# Patient Record
Sex: MALE | Race: WHITE | NOT HISPANIC OR LATINO | Employment: FULL TIME | URBAN - NONMETROPOLITAN AREA
[De-identification: names, ages, dates, MRNs, and addresses within clinical notes are randomized per-mention and may not be internally consistent; named-entity substitution may affect disease eponyms.]

---

## 2018-05-25 ENCOUNTER — OFFICE VISIT CONVERTED (OUTPATIENT)
Dept: FAMILY MEDICINE CLINIC | Age: 53
End: 2018-05-25
Attending: FAMILY MEDICINE

## 2018-06-08 ENCOUNTER — OFFICE VISIT CONVERTED (OUTPATIENT)
Dept: ORTHOPEDIC SURGERY | Facility: CLINIC | Age: 53
End: 2018-06-08
Attending: ORTHOPAEDIC SURGERY

## 2019-03-01 ENCOUNTER — OFFICE VISIT CONVERTED (OUTPATIENT)
Dept: CARDIOLOGY | Facility: CLINIC | Age: 54
End: 2019-03-01
Attending: INTERNAL MEDICINE

## 2019-03-06 ENCOUNTER — HOSPITAL ENCOUNTER (OUTPATIENT)
Dept: OTHER | Facility: HOSPITAL | Age: 54
Discharge: HOME | End: 2019-03-06
Attending: INTERNAL MEDICINE

## 2019-03-06 LAB
ALBUMIN SERPL-MCNC: 4.8 G/DL (ref 3.5–5)
ALBUMIN/GLOB SERPL: 1.8 {RATIO} (ref 1.4–2.6)
ALP SERPL-CCNC: 73 U/L (ref 56–119)
ALT SERPL-CCNC: 44 U/L (ref 10–40)
ANION GAP SERPL CALC-SCNC: 19 MMOL/L (ref 8–19)
AST SERPL-CCNC: 24 U/L (ref 15–50)
BILIRUB SERPL-MCNC: 0.52 MG/DL (ref 0.2–1.3)
BUN SERPL-MCNC: 19 MG/DL (ref 5–25)
BUN/CREAT SERPL: 16 {RATIO} (ref 6–20)
CALCIUM SERPL-MCNC: 9.7 MG/DL (ref 8.7–10.4)
CHLORIDE SERPL-SCNC: 101 MMOL/L (ref 99–111)
CHOLEST SERPL-MCNC: 136 MG/DL (ref 107–200)
CHOLEST/HDLC SERPL: 4.1 {RATIO} (ref 3–6)
CONV CO2: 25 MMOL/L (ref 22–32)
CONV TOTAL PROTEIN: 7.5 G/DL (ref 6.3–8.2)
CREAT UR-MCNC: 1.21 MG/DL (ref 0.7–1.2)
GFR SERPLBLD BASED ON 1.73 SQ M-ARVRAT: >60 ML/MIN/{1.73_M2}
GLOBULIN UR ELPH-MCNC: 2.7 G/DL (ref 2–3.5)
GLUCOSE SERPL-MCNC: 151 MG/DL (ref 70–99)
HDLC SERPL-MCNC: 33 MG/DL (ref 40–60)
LDLC SERPL CALC-MCNC: 72 MG/DL (ref 70–100)
OSMOLALITY SERPL CALC.SUM OF ELEC: 297 MOSM/KG (ref 273–304)
POTASSIUM SERPL-SCNC: 4.2 MMOL/L (ref 3.5–5.3)
SODIUM SERPL-SCNC: 141 MMOL/L (ref 135–147)
TRIGL SERPL-MCNC: 156 MG/DL (ref 40–150)
VLDLC SERPL-MCNC: 31 MG/DL (ref 5–37)

## 2019-03-07 ENCOUNTER — CONVERSION ENCOUNTER (OUTPATIENT)
Dept: CARDIOLOGY | Facility: CLINIC | Age: 54
End: 2019-03-07
Attending: INTERNAL MEDICINE

## 2019-05-06 ENCOUNTER — HOSPITAL ENCOUNTER (OUTPATIENT)
Dept: SLEEP MEDICINE | Facility: HOSPITAL | Age: 54
Discharge: HOME | End: 2019-05-06
Attending: INTERNAL MEDICINE

## 2019-05-23 ENCOUNTER — HOSPITAL ENCOUNTER (OUTPATIENT)
Dept: SLEEP MEDICINE | Facility: HOSPITAL | Age: 54
Discharge: HOME | End: 2019-05-23
Attending: INTERNAL MEDICINE

## 2019-06-04 ENCOUNTER — OFFICE VISIT CONVERTED (OUTPATIENT)
Dept: FAMILY MEDICINE CLINIC | Age: 54
End: 2019-06-04
Attending: FAMILY MEDICINE

## 2019-06-04 ENCOUNTER — HOSPITAL ENCOUNTER (OUTPATIENT)
Dept: OTHER | Facility: HOSPITAL | Age: 54
Discharge: HOME | End: 2019-06-04
Attending: FAMILY MEDICINE

## 2019-06-04 LAB
ALBUMIN SERPL-MCNC: 5.1 G/DL (ref 3.5–5)
ALBUMIN/GLOB SERPL: 1.9 {RATIO} (ref 1.4–2.6)
ALP SERPL-CCNC: 80 U/L (ref 56–119)
ALT SERPL-CCNC: 68 U/L (ref 10–40)
ANION GAP SERPL CALC-SCNC: 23 MMOL/L (ref 8–19)
AST SERPL-CCNC: 43 U/L (ref 15–50)
BILIRUB SERPL-MCNC: 0.44 MG/DL (ref 0.2–1.3)
BUN SERPL-MCNC: 16 MG/DL (ref 5–25)
BUN/CREAT SERPL: 16 {RATIO} (ref 6–20)
CALCIUM SERPL-MCNC: 9.9 MG/DL (ref 8.7–10.4)
CHLORIDE SERPL-SCNC: 95 MMOL/L (ref 99–111)
CK SERPL-CCNC: 391 U/L (ref 57–374)
CONV CO2: 24 MMOL/L (ref 22–32)
CONV TOTAL PROTEIN: 7.8 G/DL (ref 6.3–8.2)
CREAT UR-MCNC: 0.98 MG/DL (ref 0.7–1.2)
EST. AVERAGE GLUCOSE BLD GHB EST-MCNC: 166 MG/DL
GFR SERPLBLD BASED ON 1.73 SQ M-ARVRAT: >60 ML/MIN/{1.73_M2}
GLOBULIN UR ELPH-MCNC: 2.7 G/DL (ref 2–3.5)
GLUCOSE SERPL-MCNC: 139 MG/DL (ref 70–99)
HBA1C MFR BLD: 7.4 % (ref 3.5–5.7)
OSMOLALITY SERPL CALC.SUM OF ELEC: 289 MOSM/KG (ref 273–304)
POTASSIUM SERPL-SCNC: 3.9 MMOL/L (ref 3.5–5.3)
PSA SERPL-MCNC: 0.54 NG/ML (ref 0–4)
SODIUM SERPL-SCNC: 138 MMOL/L (ref 135–147)

## 2019-06-11 ENCOUNTER — OFFICE VISIT CONVERTED (OUTPATIENT)
Dept: CARDIOLOGY | Facility: CLINIC | Age: 54
End: 2019-06-11
Attending: INTERNAL MEDICINE

## 2019-08-05 ENCOUNTER — HOSPITAL ENCOUNTER (OUTPATIENT)
Dept: SLEEP MEDICINE | Facility: HOSPITAL | Age: 54
Discharge: HOME | End: 2019-08-05
Attending: INTERNAL MEDICINE

## 2019-08-05 ENCOUNTER — OUTSIDE FACILITY SERVICE (OUTPATIENT)
Dept: SLEEP MEDICINE | Facility: HOSPITAL | Age: 54
End: 2019-08-05

## 2019-08-05 PROCEDURE — 99204 OFFICE O/P NEW MOD 45 MIN: CPT | Performed by: INTERNAL MEDICINE

## 2019-09-16 ENCOUNTER — OFFICE VISIT CONVERTED (OUTPATIENT)
Dept: FAMILY MEDICINE CLINIC | Age: 54
End: 2019-09-16
Attending: FAMILY MEDICINE

## 2019-10-02 ENCOUNTER — HOSPITAL ENCOUNTER (OUTPATIENT)
Dept: LAB | Facility: HOSPITAL | Age: 54
Discharge: HOME | End: 2019-10-02
Attending: FAMILY MEDICINE

## 2019-10-02 LAB
ALBUMIN SERPL-MCNC: 5 G/DL (ref 3.5–5)
ALBUMIN/GLOB SERPL: 1.8 {RATIO} (ref 1.4–2.6)
ALP SERPL-CCNC: 77 U/L (ref 56–119)
ALT SERPL-CCNC: 85 U/L (ref 10–40)
ANION GAP SERPL CALC-SCNC: 21 MMOL/L (ref 8–19)
AST SERPL-CCNC: 63 U/L (ref 15–50)
BILIRUB SERPL-MCNC: 0.35 MG/DL (ref 0.2–1.3)
BUN SERPL-MCNC: 18 MG/DL (ref 5–25)
BUN/CREAT SERPL: 16 {RATIO} (ref 6–20)
CALCIUM SERPL-MCNC: 10.2 MG/DL (ref 8.7–10.4)
CHLORIDE SERPL-SCNC: 97 MMOL/L (ref 99–111)
CHOLEST SERPL-MCNC: 162 MG/DL (ref 107–200)
CHOLEST/HDLC SERPL: 5.8 {RATIO} (ref 3–6)
CK SERPL-CCNC: 772 U/L (ref 57–374)
CONV CO2: 24 MMOL/L (ref 22–32)
CONV TOTAL PROTEIN: 7.8 G/DL (ref 6.3–8.2)
CREAT UR-MCNC: 1.15 MG/DL (ref 0.7–1.2)
EST. AVERAGE GLUCOSE BLD GHB EST-MCNC: 157 MG/DL
GFR SERPLBLD BASED ON 1.73 SQ M-ARVRAT: >60 ML/MIN/{1.73_M2}
GLOBULIN UR ELPH-MCNC: 2.8 G/DL (ref 2–3.5)
GLUCOSE SERPL-MCNC: 113 MG/DL (ref 70–99)
HBA1C MFR BLD: 7.1 % (ref 3.5–5.7)
HDLC SERPL-MCNC: 28 MG/DL (ref 40–60)
LDLC SERPL CALC-MCNC: 54 MG/DL (ref 70–100)
OSMOLALITY SERPL CALC.SUM OF ELEC: 289 MOSM/KG (ref 273–304)
POTASSIUM SERPL-SCNC: 4 MMOL/L (ref 3.5–5.3)
SODIUM SERPL-SCNC: 138 MMOL/L (ref 135–147)
TRIGL SERPL-MCNC: 400 MG/DL (ref 40–150)
VLDLC SERPL-MCNC: 80 MG/DL (ref 5–37)

## 2019-10-17 ENCOUNTER — OFFICE VISIT CONVERTED (OUTPATIENT)
Dept: ORTHOPEDIC SURGERY | Facility: CLINIC | Age: 54
End: 2019-10-17
Attending: ORTHOPAEDIC SURGERY

## 2019-10-22 ENCOUNTER — HOSPITAL ENCOUNTER (OUTPATIENT)
Dept: OTHER | Facility: HOSPITAL | Age: 54
Discharge: HOME | End: 2019-10-22
Attending: FAMILY MEDICINE

## 2019-10-22 LAB
ALT SERPL-CCNC: 78 U/L (ref 10–40)
AST SERPL-CCNC: 54 U/L (ref 15–50)
CK SERPL-CCNC: 444 U/L (ref 57–374)

## 2019-10-23 ENCOUNTER — HOSPITAL ENCOUNTER (OUTPATIENT)
Dept: GENERAL RADIOLOGY | Facility: HOSPITAL | Age: 54
Discharge: HOME | End: 2019-10-23
Attending: ORTHOPAEDIC SURGERY

## 2019-11-21 ENCOUNTER — OFFICE VISIT CONVERTED (OUTPATIENT)
Dept: ORTHOPEDIC SURGERY | Facility: CLINIC | Age: 54
End: 2019-11-21
Attending: ORTHOPAEDIC SURGERY

## 2019-11-21 ENCOUNTER — CONVERSION ENCOUNTER (OUTPATIENT)
Dept: ORTHOPEDIC SURGERY | Facility: CLINIC | Age: 54
End: 2019-11-21

## 2019-12-20 ENCOUNTER — OFFICE VISIT CONVERTED (OUTPATIENT)
Dept: CARDIOLOGY | Facility: CLINIC | Age: 54
End: 2019-12-20
Attending: INTERNAL MEDICINE

## 2019-12-23 ENCOUNTER — HOSPITAL ENCOUNTER (OUTPATIENT)
Dept: LAB | Facility: HOSPITAL | Age: 54
Discharge: HOME | End: 2019-12-23
Attending: INTERNAL MEDICINE

## 2019-12-23 LAB
ALBUMIN SERPL-MCNC: 4.6 G/DL (ref 3.5–5)
ALBUMIN/GLOB SERPL: 1.5 {RATIO} (ref 1.4–2.6)
ALP SERPL-CCNC: 97 U/L (ref 56–119)
ALT SERPL-CCNC: 59 U/L (ref 10–40)
ANION GAP SERPL CALC-SCNC: 19 MMOL/L (ref 8–19)
AST SERPL-CCNC: 39 U/L (ref 15–50)
BILIRUB SERPL-MCNC: 0.29 MG/DL (ref 0.2–1.3)
BUN SERPL-MCNC: 20 MG/DL (ref 5–25)
BUN/CREAT SERPL: 21 {RATIO} (ref 6–20)
CALCIUM SERPL-MCNC: 9.5 MG/DL (ref 8.7–10.4)
CHLORIDE SERPL-SCNC: 98 MMOL/L (ref 99–111)
CHOLEST SERPL-MCNC: 227 MG/DL (ref 107–200)
CHOLEST/HDLC SERPL: 7.3 {RATIO} (ref 3–6)
CONV CO2: 25 MMOL/L (ref 22–32)
CONV TOTAL PROTEIN: 7.6 G/DL (ref 6.3–8.2)
CREAT UR-MCNC: 0.96 MG/DL (ref 0.7–1.2)
GFR SERPLBLD BASED ON 1.73 SQ M-ARVRAT: >60 ML/MIN/{1.73_M2}
GLOBULIN UR ELPH-MCNC: 3 G/DL (ref 2–3.5)
GLUCOSE SERPL-MCNC: 138 MG/DL (ref 70–99)
HDLC SERPL-MCNC: 31 MG/DL (ref 40–60)
LDLC SERPL CALC-MCNC: 124 MG/DL (ref 70–100)
OSMOLALITY SERPL CALC.SUM OF ELEC: 291 MOSM/KG (ref 273–304)
POTASSIUM SERPL-SCNC: 3.9 MMOL/L (ref 3.5–5.3)
SODIUM SERPL-SCNC: 138 MMOL/L (ref 135–147)
TRIGL SERPL-MCNC: 361 MG/DL (ref 40–150)
VLDLC SERPL-MCNC: 72 MG/DL (ref 5–37)

## 2020-01-02 ENCOUNTER — OFFICE VISIT CONVERTED (OUTPATIENT)
Dept: ORTHOPEDIC SURGERY | Facility: CLINIC | Age: 55
End: 2020-01-02
Attending: ORTHOPAEDIC SURGERY

## 2020-01-13 ENCOUNTER — OFFICE VISIT CONVERTED (OUTPATIENT)
Dept: FAMILY MEDICINE CLINIC | Age: 55
End: 2020-01-13
Attending: FAMILY MEDICINE

## 2020-02-13 ENCOUNTER — OFFICE VISIT CONVERTED (OUTPATIENT)
Dept: ORTHOPEDIC SURGERY | Facility: CLINIC | Age: 55
End: 2020-02-13
Attending: PHYSICIAN ASSISTANT

## 2020-02-28 ENCOUNTER — HOSPITAL ENCOUNTER (OUTPATIENT)
Dept: LAB | Facility: HOSPITAL | Age: 55
Discharge: HOME | End: 2020-02-28
Attending: FAMILY MEDICINE

## 2020-02-28 LAB
ALT SERPL-CCNC: 64 U/L (ref 10–40)
CHOLEST SERPL-MCNC: 166 MG/DL (ref 107–200)
CHOLEST/HDLC SERPL: 5 {RATIO} (ref 3–6)
HDLC SERPL-MCNC: 33 MG/DL (ref 40–60)
LDLC SERPL CALC-MCNC: 85 MG/DL (ref 70–100)
TRIGL SERPL-MCNC: 239 MG/DL (ref 40–150)
VLDLC SERPL-MCNC: 48 MG/DL (ref 5–37)

## 2020-02-29 ENCOUNTER — HOSPITAL ENCOUNTER (OUTPATIENT)
Dept: OTHER | Facility: HOSPITAL | Age: 55
Discharge: HOME | End: 2020-02-29
Attending: FAMILY MEDICINE

## 2020-02-29 LAB — CK SERPL-CCNC: 404 U/L (ref 57–374)

## 2020-04-07 ENCOUNTER — HOSPITAL ENCOUNTER (OUTPATIENT)
Dept: LAB | Facility: HOSPITAL | Age: 55
Discharge: HOME | End: 2020-04-07
Attending: INTERNAL MEDICINE

## 2020-04-07 LAB
CHOLEST SERPL-MCNC: 149 MG/DL (ref 107–200)
CHOLEST/HDLC SERPL: 4.8 {RATIO} (ref 3–6)
HDLC SERPL-MCNC: 31 MG/DL (ref 40–60)
LDLC SERPL CALC-MCNC: 85 MG/DL (ref 70–100)
TRIGL SERPL-MCNC: 165 MG/DL (ref 40–150)
VLDLC SERPL-MCNC: 33 MG/DL (ref 5–37)

## 2020-05-14 ENCOUNTER — OFFICE VISIT CONVERTED (OUTPATIENT)
Dept: ORTHOPEDIC SURGERY | Facility: CLINIC | Age: 55
End: 2020-05-14
Attending: PHYSICIAN ASSISTANT

## 2020-07-22 ENCOUNTER — OFFICE VISIT CONVERTED (OUTPATIENT)
Dept: CARDIOLOGY | Facility: CLINIC | Age: 55
End: 2020-07-22
Attending: INTERNAL MEDICINE

## 2020-10-16 ENCOUNTER — HOSPITAL ENCOUNTER (OUTPATIENT)
Dept: OTHER | Facility: HOSPITAL | Age: 55
Discharge: HOME | End: 2020-10-16
Attending: FAMILY MEDICINE

## 2020-10-16 LAB
ERYTHROCYTE [DISTWIDTH] IN BLOOD BY AUTOMATED COUNT: 11.8 % (ref 11.6–14.4)
HCT VFR BLD AUTO: 46.2 % (ref 42–52)
HGB BLD-MCNC: 15.9 G/DL (ref 14–18)
MCH RBC QN AUTO: 30.9 PG (ref 27–31)
MCHC RBC AUTO-ENTMCNC: 34.4 G/DL (ref 33–37)
MCV RBC AUTO: 89.9 FL (ref 80–96)
PLATELET # BLD AUTO: 319 10*3/UL (ref 130–400)
PMV BLD AUTO: 10 FL (ref 9.4–12.4)
PSA SERPL-MCNC: 0.42 NG/ML (ref 0–4)
RBC # BLD AUTO: 5.14 10*6/UL (ref 4.7–6.1)
TSH SERPL-ACNC: 1.16 M[IU]/L (ref 0.27–4.2)
WBC # BLD AUTO: 8.76 10*3/UL (ref 4.8–10.8)

## 2020-10-17 LAB
ALBUMIN SERPL-MCNC: 5 G/DL (ref 3.5–5)
ALBUMIN/GLOB SERPL: 1.9 {RATIO} (ref 1.4–2.6)
ALP SERPL-CCNC: 82 U/L (ref 56–119)
ALT SERPL-CCNC: 46 U/L (ref 10–40)
ANION GAP SERPL CALC-SCNC: 20 MMOL/L (ref 8–19)
AST SERPL-CCNC: 32 U/L (ref 15–50)
BILIRUB SERPL-MCNC: 0.54 MG/DL (ref 0.2–1.3)
BUN SERPL-MCNC: 12 MG/DL (ref 5–25)
BUN/CREAT SERPL: 13 {RATIO} (ref 6–20)
CALCIUM SERPL-MCNC: 9.7 MG/DL (ref 8.7–10.4)
CHLORIDE SERPL-SCNC: 102 MMOL/L (ref 99–111)
CHOLEST SERPL-MCNC: 140 MG/DL (ref 107–200)
CHOLEST/HDLC SERPL: 3.8 {RATIO} (ref 3–6)
CONV CO2: 23 MMOL/L (ref 22–32)
CONV CREATININE URINE, RANDOM: 156.9 MG/DL (ref 10–300)
CONV MICROALBUM.,U,RANDOM: 58.4 MG/L (ref 0–20)
CONV TOTAL PROTEIN: 7.6 G/DL (ref 6.3–8.2)
CREAT UR-MCNC: 0.96 MG/DL (ref 0.7–1.2)
EST. AVERAGE GLUCOSE BLD GHB EST-MCNC: 232 MG/DL
GFR SERPLBLD BASED ON 1.73 SQ M-ARVRAT: >60 ML/MIN/{1.73_M2}
GLOBULIN UR ELPH-MCNC: 2.6 G/DL (ref 2–3.5)
GLUCOSE SERPL-MCNC: 155 MG/DL (ref 70–99)
HBA1C MFR BLD: 9.7 % (ref 3.5–5.7)
HDLC SERPL-MCNC: 37 MG/DL (ref 40–60)
LDLC SERPL CALC-MCNC: 62 MG/DL (ref 70–100)
MICROALBUMIN/CREAT UR: 37.2 MG/G{CRE} (ref 0–25)
OSMOLALITY SERPL CALC.SUM OF ELEC: 295 MOSM/KG (ref 273–304)
POTASSIUM SERPL-SCNC: 3.7 MMOL/L (ref 3.5–5.3)
SODIUM SERPL-SCNC: 141 MMOL/L (ref 135–147)
TRIGL SERPL-MCNC: 204 MG/DL (ref 40–150)
VLDLC SERPL-MCNC: 41 MG/DL (ref 5–37)

## 2020-10-23 ENCOUNTER — OFFICE VISIT CONVERTED (OUTPATIENT)
Dept: FAMILY MEDICINE CLINIC | Age: 55
End: 2020-10-23
Attending: FAMILY MEDICINE

## 2020-12-29 ENCOUNTER — HOSPITAL ENCOUNTER (OUTPATIENT)
Dept: URGENT CARE | Facility: CLINIC | Age: 55
Discharge: HOME | End: 2020-12-29

## 2021-02-11 ENCOUNTER — OFFICE VISIT CONVERTED (OUTPATIENT)
Dept: CARDIOLOGY | Facility: CLINIC | Age: 56
End: 2021-02-11
Attending: INTERNAL MEDICINE

## 2021-05-07 ENCOUNTER — HOSPITAL ENCOUNTER (OUTPATIENT)
Dept: OTHER | Facility: HOSPITAL | Age: 56
Discharge: HOME | End: 2021-05-07
Attending: FAMILY MEDICINE

## 2021-05-07 LAB
ALBUMIN SERPL-MCNC: 5.2 G/DL (ref 3.5–5)
ALBUMIN/GLOB SERPL: 1.9 {RATIO} (ref 1.4–2.6)
ALP SERPL-CCNC: 66 U/L (ref 56–119)
ALT SERPL-CCNC: 48 U/L (ref 10–40)
ANION GAP SERPL CALC-SCNC: 16 MMOL/L (ref 8–19)
AST SERPL-CCNC: 38 U/L (ref 15–50)
BILIRUB SERPL-MCNC: 0.73 MG/DL (ref 0.2–1.3)
BUN SERPL-MCNC: 13 MG/DL (ref 5–25)
BUN/CREAT SERPL: 12 {RATIO} (ref 6–20)
CALCIUM SERPL-MCNC: 9.6 MG/DL (ref 8.7–10.4)
CHLORIDE SERPL-SCNC: 98 MMOL/L (ref 99–111)
CHOLEST SERPL-MCNC: 131 MG/DL (ref 107–200)
CHOLEST/HDLC SERPL: 3.6 {RATIO} (ref 3–6)
CK SERPL-CCNC: 499 U/L (ref 57–374)
CONV CO2: 28 MMOL/L (ref 22–32)
CONV CREATININE URINE, RANDOM: 95.4 MG/DL (ref 10–300)
CONV MICROALBUM.,U,RANDOM: 15 MG/L (ref 0–20)
CONV TOTAL PROTEIN: 8 G/DL (ref 6.3–8.2)
CREAT UR-MCNC: 1.06 MG/DL (ref 0.7–1.2)
ERYTHROCYTE [DISTWIDTH] IN BLOOD BY AUTOMATED COUNT: 12.5 % (ref 11.5–14.5)
EST. AVERAGE GLUCOSE BLD GHB EST-MCNC: 157 MG/DL
GFR SERPLBLD BASED ON 1.73 SQ M-ARVRAT: >60 ML/MIN/{1.73_M2}
GLOBULIN UR ELPH-MCNC: 2.8 G/DL (ref 2–3.5)
GLUCOSE SERPL-MCNC: 121 MG/DL (ref 70–99)
HBA1C MFR BLD: 16.9 G/DL (ref 14–18)
HBA1C MFR BLD: 7.1 % (ref 3.5–5.7)
HCT VFR BLD AUTO: 50.7 % (ref 42–52)
HDLC SERPL-MCNC: 36 MG/DL (ref 40–60)
LDLC SERPL CALC-MCNC: 47 MG/DL (ref 70–100)
MCH RBC QN AUTO: 30.8 PG (ref 27–31)
MCHC RBC AUTO-ENTMCNC: 33.3 G/DL (ref 33–37)
MCV RBC AUTO: 92.5 FL (ref 80–96)
MICROALBUMIN/CREAT UR: 15.7 MG/G{CRE} (ref 0–25)
OSMOLALITY SERPL CALC.SUM OF ELEC: 287 MOSM/KG (ref 273–304)
PLATELET # BLD AUTO: 331 10*3/UL (ref 130–400)
PMV BLD AUTO: 9 FL (ref 7.4–10.4)
POTASSIUM SERPL-SCNC: 4.1 MMOL/L (ref 3.5–5.3)
RBC # BLD AUTO: 5.48 10*6/UL (ref 4.7–6.1)
SODIUM SERPL-SCNC: 138 MMOL/L (ref 135–147)
TRIGL SERPL-MCNC: 238 MG/DL (ref 40–150)
VLDLC SERPL-MCNC: 48 MG/DL (ref 5–37)
WBC # BLD AUTO: 10.8 10*3/UL (ref 4.8–10.8)

## 2021-05-11 ENCOUNTER — OFFICE VISIT CONVERTED (OUTPATIENT)
Dept: FAMILY MEDICINE CLINIC | Age: 56
End: 2021-05-11
Attending: FAMILY MEDICINE

## 2021-05-13 NOTE — PROGRESS NOTES
"   Progress Note      Patient Name: Kurt Saucedo   Patient ID: [id number]   Sex: Male   YOB: 1965    Primary Care Provider: Stevan Rubio MD   Referring Provider: Stevan Rubio MD    Visit Date: July 22, 2020    Provider: Adrian Treadwell MD   Location: [facility]   Location Address: [address] [place] [address], [facility]   [place], [place]  [id number]   Location Phone: [phone]          Chief Complaint     Coronary artery disease.       History Of Present Illness  REFERRING CARE PROVIDER: Stevan Rubio MD   Kurt Saucedo is a 55 year old /White male with a known coronary artery disease with occlusion of his RCA, hypertension, and dyslipidemia. Patient has not been suffering any anginal chest discomfort.   PAST MEDICAL HISTORY: CVA; Coronary artery disease with non-ST-elevation myocardial infarction, 100% occlusion of the RCA; Diabetes mellitus; Hyperlipidemia; Hypertension.   FAMILY HISTORY: Positive for diabetes mellitus and hypertension. Negative for heart disease.   PSYCHOSOCIAL HISTORY: Walks 6 miles daily. Previously smoked, but quit. Moderate alcohol consumption. Denies mood changes or depression.   CURRENT MEDICATIONS: Aspirin 81 mg daily; metoprolol succinate 25 mg b.i.d.; lisinopril-hydrochlorothiazide 20-25 mg daily; rosuvastatin 20 mg daily; paroxetine 20 mg daily; metformin 500 mg b.i.d.       Review of Systems  · Cardiovascular  o Denies  o : palpitations (fast, fluttering, or skipping beats), swelling (feet, ankles, hands), shortness of breath while walking or lying flat, chest pain or angina pectoris   · Respiratory  o Denies  o : chronic or frequent cough, asthma or wheezing      Vitals  Date Time BP Position Site L\R Cuff Size HR RR TEMP (F) WT  HT  BMI kg/m2 BSA m2 O2 Sat HC       07/22/2020 08:14 /83 Sitting    72 - R   258lbs 0oz 6'  2\" 33.12 2.47           Physical Examination  · Constitutional  o Appearance  o : Awake, alert, in no acute " distress.   · Eyes  o Conjunctivae  o : Normal.  · Ears, Nose, Mouth and Throat  o Oral Cavity  o :   § Oral Mucosa  § : Normal.  · Neck  o Inspection/Palpation  o : No JVD. Good carotid upstroke. No thyromegaly.  · Respiratory  o Respiratory  o : Good respiratory effort. Clear to percussion and auscultation.  · Cardiovascular  o Heart  o :   § Auscultation of Heart  § : S1, S2 normal. Regular rate and rhythm without murmurs, gallops, or rubs.  o Peripheral Vascular System  o :   § Extremities  § : Good femoral and pedal pulses. No pedal edema.  · Gastrointestinal  o Abdominal Examination  o : Soft. No tenderness or masses felt. No hepatosplenomegaly. Abdominal aorta is not palpable.  · Labs  o Labs  o : Previous LDL cholesterol was 85, HDL 71.          Assessment     ASSESSMENT & PLAN:    1.  Coronary artery disease with previous CABG, no angina, on chronic aspirin 81 mg once a day.  2.  Hyperlipidemia.  LDL is mildly above goal.  Recommended the addition of Zetia 10 mg daily and repeat lipids        and LFTs on next visit.  3.  Hypertension, controlled.      Adrian Treadwell MD  JH:vm             Electronically Signed by: Maria D Bennett-, Other -Author on July 23, 2020 03:05:24 PM  Electronically Co-signed by: Adrian Treadwell MD -Reviewer on July 27, 2020 10:40:02 AM

## 2021-05-13 NOTE — PROGRESS NOTES
Progress Note      Patient Name: Kurt Saucedo   Patient ID: [id number]   Sex: Male   YOB: 1965    Primary Care Provider: Stevan Rubio MD   Referring Provider: Stevan Rubio MD    Visit Date: May 14, 2020    Provider: Catalino Espinoza PA-C   Location: [facility]   Location Address: [address] [place] [address]  [id number]   Location Phone: [phone]          Chief Complaint  · Follow up left hip replacement      History Of Present Illness  Kurt Saucedo is a 55 year old /White male who presents today to [facility]. Patient presents for follow-up evaluation of left total hip arthroplasty, 11/6/2019 this is a 6-month follow-up. Patient states he has no complaints, no pain, no difficulty with range of motion or ambulation, denies need for anti-inflammatory medicine or pain medication. Patient has been been working and states he has been working long hours at work and has no difficulty using the hip and no pain issues.       Past Medical History  Diabetes; Heart Attack; High blood pressure; High cholesterol; Hypertension; Pain of left hip joint; Post-traumatic Primary osteoarthritis of hip, left; Primary osteoarthritis of hip, Left         Past Surgical History  *Metal Implant; Colonoscopy; Joint Surgery; Surgical Clips         Medication List  atorvastatin 20 mg oral tablet; Lipitor 20 mg oral tablet; lisinopril-hydrochlorothiazide 20-25 mg oral tablet; metformin 500 mg oral tablet; paroxetine HCl 20 mg oral tablet         Allergy List  NO KNOWN DRUG ALLERGIES       Allergies Reconciled  Family Medical History  Heart Disease; Diabetes, unspecified type; Renal Calculus         Social History  Alcohol (Current some day); Alcohol Use (Current some day); Caffeine (Current - status unknown); .; lives with parents; Recreational Drug Use (Never); Second hand smoke exposure (Current some day); Tobacco (Former); Working         Review of  "Systems  · Constitutional  o Denies  o : fever, chills, weight loss  · Cardiovascular  o Denies  o : chest pain, shortness of breath  · Gastrointestinal  o Denies  o : liver disease, heartburn, nausea, blood in stools  · Genitourinary  o Denies  o : painful urination, blood in urine  · Integument  o Denies  o : rash, itching  · Neurologic  o Denies  o : headache, weakness, loss of consciousness  · Musculoskeletal  o Denies  o : painful, swollen joints  · Psychiatric  o Denies  o : drug/alcohol addiction, anxiety, depression      Vitals  Date Time BP Position Site L\R Cuff Size HR RR TEMP (F) WT  HT  BMI kg/m2 BSA m2 O2 Sat        05/14/2020 04:12 PM      73 - R   255lbs 0oz 6'  2\" 32.74 2.46 96 %          Physical Examination  · Constitutional  o Appearance  o : well developed, well-nourished, no obvious deformities present  · Head and Face  o Head  o :   § Inspection  § : normocephalic  o Face  o :   § Inspection  § : no facial lesions  · Eyes  o Conjunctivae  o : conjunctivae normal  o Sclerae  o : sclerae white  · Ears, Nose, Mouth and Throat  o Ears  o :   § External Ears  § : appearance within normal limits  § Hearing  § : intact  o Nose  o :   § External Nose  § : appearance normal  · Neck  o Inspection/Palpation  o : normal appearance  o Range of Motion  o : full range of motion  · Respiratory  o Respiratory Effort  o : breathing unlabored  o Inspection of Chest  o : normal appearance  o Auscultation of Lungs  o : no audible wheezing or rales  · Cardiovascular  o Heart  o : regular rate  · Gastrointestinal  o Abdominal Examination  o : soft and non-tender  · Skin and Subcutaneous Tissue  o General Inspection  o : intact, no rashes  · Psychiatric  o General  o : Alert and oriented x3  o Judgement and Insight  o : judgment and insight intact  o Mood and Affect  o : mood normal, affect appropriate  · Left Hip  o Inspection  o : Incision is well-healed, no redness, no swelling, no ecchymosis, no signs of " infection. Flexion 115, extension 0, abduction 40, internal rotation 35, external rotation 45, no pain with rotation. Leg lengths equal.   · In Office Procedures  o View  o : AP/LATERAL  o Site  o : left, hip   o Indication  o : Left hip pain   o Study  o : X-rays ordered, taken in the office, and reviewed today.  o Xray  o : reveals an intact appearing left hip replacement without complication, no evidence of periprosthetic fracture, subsidence or loosening           Assessment  · Aftercare following surgery of left total hip arthroplasty, 11/6/2019     V54.81  · Left Pain: Hip     719.45/M25.559      Plan  · Orders  o Hip (Left) 2 or more views (includes AP Pelvis) [facility] Preferred View (71684) - 719.45/M25.559 - 05/14/2020  · Medications  o Medications have been Reconciled  o Transition of Care or Provider Policy  · Instructions  o Reviewed the patient's Past Medical, Social, and Family history as well as the ROS at today's visit, no changes.  o Call or return if worsening symptoms.  o Follow up in 6 months.  o Reviewed x-rays with patient, discuss that he should continue activity as tolerated, follow-up in 6 months for 1 year evaluation with x-rays.            Electronically Signed by: Catalino Espinoza PA-C -Author on May 14, 2020 05:19:41 PM

## 2021-05-14 NOTE — PROGRESS NOTES
"   Progress Note      Patient Name: Kurt Saucedo   Patient ID: [id number]   Sex: Male   YOB: 1965    Primary Care Provider: Stevan Rubio MD   Referring Provider: Stevan Rubio MD    Visit Date: February 11, 2021    Provider: Adrian Treadwell MD   Location: [facility]   Location Address: [address] [place] [address], [facility]   [place], [place]  [id number]   Location Phone: [phone]          Chief Complaint     Coronary artery disease.       History Of Present Illness  Video Conferencing Visit  Kurt Saucedo is a 55 year old /White male with CAD and prior occlusion of the RCA, hypertension, and dyslipidemia who has been doing well. No complaints or problems. Denies chest pain or shortness of breath. Evaluation via video conferencing. Verbal consent obtained before beginning visit.   The following staff were present during this visit: Provider only.      PAST MEDICAL HISTORY: CVA; Coronary artery disease with non-ST-elevation myocardial infarction, 100% occlusion of the RCA; Diabetes mellitus; Hyperlipidemia; Hypertension.     CURRENT MEDICATIONS:  Aspirin 81 mg daily; ezetimibe 10 mg daily; metoprolol succinate ER 25 mg daily; lisinopril-hydrochlorothiazide 20-25 mg daily; rosuvastatin 20 mg daily; paroxetine 20 mg daily; metformin  mg b.i.d.    ALLERGIES:  No known drug allergies.       Vitals     Patient unable to obtain vitals.  Weight 244.  Height 6'2\".           Assessment     ASSESSMENT & PLAN:    1.  Coronary artery disease.  No angina.  On chronic aspirin 81 mg once a day.  2.  Hyperlipidemia.  Previously mildly above goal but added on Zetia.  Will check lipids and LFTs.  Goal LDL of        less than 70.    3.  Hypertension.  Recommended the patient get a home blood pressure monitor to keep track of his blood        pressure recordings.          Adrian Treadwell MD  JH:vm             Electronically Signed by: Maria D Bennett-, Other -Author on February 15, " 2021 07:14:59 PM  Electronically Co-signed by: Adrian Treadwell MD -Reviewer on February 16, 2021 02:02:04 PM

## 2021-05-15 VITALS — WEIGHT: 266.25 LBS | HEIGHT: 74 IN | HEART RATE: 92 BPM | OXYGEN SATURATION: 97 % | BODY MASS INDEX: 34.17 KG/M2

## 2021-05-15 VITALS — HEART RATE: 93 BPM | WEIGHT: 262 LBS | HEIGHT: 74 IN | BODY MASS INDEX: 33.62 KG/M2 | OXYGEN SATURATION: 98 %

## 2021-05-15 VITALS
SYSTOLIC BLOOD PRESSURE: 138 MMHG | BODY MASS INDEX: 32.73 KG/M2 | HEIGHT: 74 IN | DIASTOLIC BLOOD PRESSURE: 84 MMHG | WEIGHT: 255 LBS | HEART RATE: 76 BPM

## 2021-05-15 VITALS
SYSTOLIC BLOOD PRESSURE: 136 MMHG | DIASTOLIC BLOOD PRESSURE: 83 MMHG | HEART RATE: 72 BPM | WEIGHT: 258 LBS | BODY MASS INDEX: 33.11 KG/M2 | HEIGHT: 74 IN

## 2021-05-15 VITALS — BODY MASS INDEX: 32.73 KG/M2 | WEIGHT: 255 LBS | OXYGEN SATURATION: 96 % | HEART RATE: 73 BPM | HEIGHT: 74 IN

## 2021-05-15 VITALS
WEIGHT: 267 LBS | HEART RATE: 78 BPM | BODY MASS INDEX: 34.27 KG/M2 | SYSTOLIC BLOOD PRESSURE: 120 MMHG | HEIGHT: 74 IN | DIASTOLIC BLOOD PRESSURE: 80 MMHG

## 2021-05-15 VITALS — BODY MASS INDEX: 34.14 KG/M2 | WEIGHT: 266 LBS | HEIGHT: 74 IN | HEART RATE: 89 BPM | OXYGEN SATURATION: 97 %

## 2021-05-16 VITALS
HEART RATE: 86 BPM | WEIGHT: 266 LBS | HEIGHT: 74 IN | SYSTOLIC BLOOD PRESSURE: 140 MMHG | BODY MASS INDEX: 34.14 KG/M2 | DIASTOLIC BLOOD PRESSURE: 86 MMHG

## 2021-05-16 VITALS — HEIGHT: 74 IN | WEIGHT: 252.12 LBS | BODY MASS INDEX: 32.36 KG/M2 | HEART RATE: 92 BPM | OXYGEN SATURATION: 98 %

## 2021-05-16 VITALS
DIASTOLIC BLOOD PRESSURE: 77 MMHG | HEART RATE: 72 BPM | BODY MASS INDEX: 33.88 KG/M2 | HEIGHT: 74 IN | WEIGHT: 264 LBS | SYSTOLIC BLOOD PRESSURE: 128 MMHG

## 2021-05-18 NOTE — PROGRESS NOTES
Kurt Saucedo 1965     Office/Outpatient Visit    Visit Date: Mon, Sep 16, 2019 12:46 pm    Provider: Stevan Rubio MD (Assistant: Kita Mckinley RN)    Location: [facility]        Electronically signed by Stevan Rubio MD on  09/16/2019 05:11:15 PM                             SUBJECTIVE:        CC: diabetes, blood pressure, cholesterol, CAD         HPI:         Mr. Saucedo presents with type 2 diabetes.  Current meds include an oral hypoglycemic ( Glucophage XR ).  He reports home blood glucose readings have been fairly good, with average fasting glucoses running in the 120-150 mg/dL range. He checks his glucose 1 to 2 times daily.  Most recent lab results include Hemoglobin A1c:  7.4 (%) (06/04/2019), LDL:  82 (02/27/2018).          Concerning mixed hyperlipidemia, current treatment includes Lipitor.  Compliance with treatment has been good; he takes his medication as directed and follows up as directed.          With regard to the essential hypertension, his current cardiac medication regimen includes a combination medication ( Zestoretic ).  He is tolerating the medication well without side effects.  Compliance with treatment has been good; he takes his medication as directed and follows up as directed.      ROS:     CONSTITUTIONAL:  Negative for chills and fever.      CARDIOVASCULAR:  Negative for chest pain and palpitations.      RESPIRATORY:  Negative for recent cough and dyspnea.      GASTROINTESTINAL:  Negative for abdominal pain, nausea and vomiting.          PM/FM/SH:     Last Reviewed on 9/16/2019 01:16 PM by Stevan Rubio    Past Medical History:             PAST MEDICAL HISTORY         Coronary Artery Disease    Hypertension     non a non b hepatitis     Hospitalizations: hepatitis         PREVENTIVE HEALTH MAINTENANCE             COLORECTAL CANCER SCREENING: Up to date (colonoscopy q10y; sigmoidoscopy q5y; Cologuard q3y) was last done 05/2017, Results are in chart;  colonoscopy with the following abnormalities noted-- tubular adenoma         Surgical History:         Circumcision: at birth;     Fracture Repair: pelvis; 1997;       Tonsillectomy/Adenoidectomy; at age 9     Procedures: Treadmill stress test 2000         Family History:     Father: Arrhythmia ( Atrial Fibrillation ); Congestive Heart Failure;  COPD;  Type 2 Diabetes     Mother: Healthy         Social History:     Occupation:      Marital Status:          Tobacco/Alcohol/Supplements:     Last Reviewed on 9/16/2019 01:16 PM by Stevan Rubio    Tobacco: He has a past history of cigarette smoking; quit date:  02/01/17.          Alcohol: Frequency:    'once in a while';         Substance Abuse History:     Last Reviewed on 9/16/2019 01:16 PM by Stevan Rubio        Mental Health History:     Last Reviewed on 9/16/2019 01:16 PM by Stevan Rubio        Communicable Diseases (eg STDs):     Last Reviewed on 9/16/2019 01:16 PM by Stevan Rubio            Current Problems:     Last Reviewed on 9/16/2019 01:16 PM by Stevan Rubio    Coronary artery disease     Hip pain     Type 2 diabetes     Mixed hyperlipidemia     Premature ejaculation     Essential hypertension     Screening for rectal cancer     Screening for prostate cancer         Immunizations:     Havrix -adult dose (HepA) 12/5/2018     Havrix -adult dose (HepA) 6/1/2018     Fluzone (3 + years dose) 10/1/2018         Allergies:     Last Reviewed on 9/16/2019 01:16 PM by Stevan Rubio      No Known Drug Allergies.         Current Medications:     Last Reviewed on 9/16/2019 01:16 PM by Stevan Rubio    Lisinopril/Hydrochlorothiazide 20mg/25mg Tablet Take 1 tablet(s) by mouth daily     Metformin HCl 500mg Tablets, Extended Release Take 2 tablet(s) by mouth daily     Paxil 20mg Tablet Take 1 tablet(s) by mouth daily     Atorvastatin Calcium 80mg Tablet 1 tab daily     Aspirin (ASA) 81mg Tablets,  Enteric Coated 1 tab daily     Brilinta 90mg Tablet 1 TAB DAILY     Metoprolol Succinate 25mg Tablets, Extended Release 1 tablet BID         OBJECTIVE:        Vitals:         Current: 9/16/2019 12:51:05 PM    Ht:  6 ft, 2 in;  Wt: 264.6 lbs;  BMI: 34.0    T: 97.8 F (oral);  BP: 133/75 mm Hg (left arm, sitting);  P: 61 bpm (left arm (BP Cuff), sitting);  sCr: 0.98 mg/dL;  GFR: 104.52        Exams:     PHYSICAL EXAM:     GENERAL: Vitals recorded well developed,  moderately obese;     EYES: extraocular movements intact; conjunctiva and cornea are normal; PERRL;     E/N/T: EARS:  normal external auditory canals and tympanic membranes;  grossly normal hearing; OROPHARYNX:  normal mucosa, dentition, gingiva, and posterior pharynx;     NECK: range of motion is normal; thyroid is non-palpable;     RESPIRATORY: normal respiratory rate and pattern with no distress; normal breath sounds with no rales, rhonchi, wheezes or rubs;     CARDIOVASCULAR: normal rate; rhythm is regular;  no systolic murmur;     GASTROINTESTINAL: nontender; normal bowel sounds; no masses;     LYMPHATIC: no enlargement of cervical or facial nodes; no supraclavicular nodes;     SKIN:  no significant rashes or lesions; no suspicious moles;     NEUROLOGIC: mental status: alert and oriented x 3; cranial nerves II-XII grossly intact;     PSYCHIATRIC: appropriate affect and demeanor; normal psychomotor function;         ASSESSMENT           250.00   E11.9  Type 2 diabetes              DDx:     272.2   E78.2  Mixed hyperlipidemia              DDx:     401.1   I10  Essential hypertension              DDx:     414.01   I25.119  Coronary artery disease              DDx:         ORDERS:         Radiology/Test Orders:       3017F  Colorectal CA screen results documented and reviewed (PV)  (In-House)           Lab Orders:       [id number]  ([facility])  (CC DR. CARMELA ELIZABETH - CARDIOLOGY        ===============================)       [id number]  HTNLP -  [facility] CMP AND LIPID: [id number], [id number]  (Send-Out)         38133  CK - [facility]- CK total  (Send-Out)           Other Orders:         Depression screen negative  (In-House)                   PLAN:          Type 2 diabetes     Today, we have reviewed his care.  We will arrange repeat fasting labs and follow from there.  No near term change is anticipated.      LABORATORY:  Labs ordered to be performed today include HgbA1C.  [facility] PHQ-9 Depression Screening: Completed form scanned and in chart; Total Score 0; Negative Depression Screen           Orders:       [id number]  A1CEG - [facility] Hemoglobin A1C  (Send-Out)  (CC DR. CARMELA ELIZABETH - CARDIOLOGY        ===============================)         Depression screen negative  (In-House)         3017F  Colorectal CA screen results documented and reviewed (PV)  (In-House)             Patient Education Handouts:       Non-Insulin Dependent Diabetes Mellitus (NIDDM)           Mixed hyperlipidemia     LABORATORY:  Labs ordered to be performed today include CK, total and HTN/Lipid Panel: CMP, Lipid.            Orders:       [id number]  HTNLP - [facility] CMP AND LIPID: [id number], [id number]  (Send-Out)         35202  CK - [facility]- CK total  (Send-Out)            Essential hypertension As above.          Coronary artery disease As above.             CHARGE CAPTURE           **Please note: ICD descriptions below are intended for billing purposes only and may not represent clinical diagnoses**        Primary Diagnosis:         250.00 Type 2 diabetes            E11.9    Type 2 diabetes mellitus without complications              Orders:          [id number]   Office/outpatient visit; established patient, level 4  (In-House)                Depression screen negative  (In-House)             3017F   Colorectal CA screen results documented and reviewed (PV)  (In-House)           272.2 Mixed hyperlipidemia            E78.2    Mixed hyperlipidemia    401.1 Essential hypertension            I10    Essential (primary)  hypertension    414.01 Coronary artery disease            I25.119    Atherosclerotic heart disease of native coronary artery with unspecified angina pectoris

## 2021-05-18 NOTE — PROGRESS NOTES
Kurt Saucedo  1965     Office/Outpatient Visit    Visit Date: Tue, May 11, 2021 01:44 pm    Provider: Stevan Rubio MD (Assistant: Kita Mckinley RN)    Location: [facility]        Electronically signed by Stevan Rubio MD on  05/12/2021 01:30:45 PM                             Subjective:        CC: diabetes, blood pressure, cholesterol    HPI:           Patient presents with type 2 diabetes mellitus without complications.  Current meds include an oral hypoglycemic ( Glucophage XR and Jardiance ).  He reports home blood glucose readings have been fairly good, with average fasting glucoses running in the 120-150 mg/dL range.  Most recent lab results include Hemoglobin A1c:  9.7 (%) (10/16/2020),  7.1 (%) (05/07/2021), LDL:  47 (mg/dL) (05/07/2021).            Concerning essential (primary) hypertension, his current cardiac medication regimen includes a beta-blocker ( Toprol-XL ) and a combination medication ( Zestoretic ).  Review of his blood pressure log reveals systolics in the 120s.  He is tolerating the medication well without side effects.  Compliance with treatment has been good; he takes his medication as directed and follows up as directed.            In regard to the mixed hyperlipidemia, current treatment includes Crestor and Zetia and diet.  Compliance with treatment has been good; he takes his medication as directed and follows up as directed.      ROS:     CONSTITUTIONAL:  Negative for chills and fever.      CARDIOVASCULAR:  Negative for chest pain and palpitations.      RESPIRATORY:  Negative for recent cough and dyspnea.      GASTROINTESTINAL:  Negative for abdominal pain, nausea and vomiting.      INTEGUMENTARY:  Negative for atypical mole(s) and rash.          Past Medical History / Family History / Social History:         Last Reviewed on 5/11/2021 02:26 PM by Stevan Rubio    Past Medical History:             PAST MEDICAL HISTORY         Coronary  Patient is asking if he needed to be tested for COVID again. Patient is symtptom free. He was tested at the hospital on 11/13/2020. Informed him he did not need to be tested again. Patient verbalized understanding.    Artery Disease    Hyperlipidemia    Hypertension     Type 2 Diabetes     non a non b hepatitis     Hospitalizations: hepatitis         PREVENTIVE HEALTH MAINTENANCE             COLORECTAL CANCER SCREENING: Up to date (colonoscopy q10y; sigmoidoscopy q5y; Cologuard q3y) was last done 05/2017, Results are in chart; colonoscopy with the following abnormalities noted-- tubular adenoma         Surgical History:         Circumcision: at birth;     Fracture Repair: pelvis; 1997;     Joint Replacement: L Hip; 11/2019;     Tonsillectomy/Adenoidectomy; at age 9     Procedures: Treadmill stress test 2000         Family History:     Father: Arrhythmia ( Atrial Fibrillation ); Congestive Heart Failure;  COPD;  Type 2 Diabetes     Mother: Healthy         Social History:     Occupation: [employer]     Marital Status:          Tobacco/Alcohol/Supplements:     Last Reviewed on 5/11/2021 02:26 PM by Stevan Rubio    Tobacco: He has a past history of cigarette smoking; quit date:  02/01/17.  Kurt was around 20 years old when he started smoking.  He stopped smoking at around age 52.  During the 32 years that he smoked, he averaged about 1.5 packs daily.  He has over 40 pack years.         Alcohol: Frequency:    'once in a while';         Substance Abuse History:     Last Reviewed on 5/11/2021 02:26 PM by Stevan Rubio        Mental Health History:     Last Reviewed on 5/11/2021 02:26 PM by Stevan Rubio        Communicable Diseases (eg STDs):     Last Reviewed on 5/11/2021 02:26 PM by Stevan Rubio        Current Problems:     Last Reviewed on 5/11/2021 02:26 PM by Stevan Rubio    Essential (primary) hypertension    Premature ejaculation    Mixed hyperlipidemia    Encounter for screening for malignant neoplasm of prostate    Type 2 diabetes mellitus without complications    Encounter for screening for malignant neoplasm of rectum    Pain in left hip    Atherosclerotic heart disease of  native coronary artery with unspecified angina pectoris    Encounter for screening for depression    Encounter for general adult medical examination with abnormal findings        Immunizations:     influenza, injectable, quadrivalent 10/18/2019    Havrix -adult dose (HepA) 12/5/2018    Havrix -adult dose (HepA) 6/1/2018    Fluzone (3 + years dose) 10/1/2018        Allergies:     Last Reviewed on 5/11/2021 02:26 PM by Stevan Rubio    No Known Allergies.        Current Medications:     Last Reviewed on 5/11/2021 02:26 PM by Stevan Rubio    Paxil 20 mg oral tablet [TAKE ONE TABLET BY MOUTH DAILY]    lisinopril-hydrochlorothiazide 20-25 mg oral tablet [TAKE ONE TABLET BY MOUTH DAILY]    metFORMIN 500 mg oral Tablet, Extended Release 24 hr [TAKE TWO TABLETS BY MOUTH DAILY]    Metoprolol Succinate 25 mg oral Tablet, Extended Release 24 hr [1 tablet BID]    Brilinta 90 mg oral tablet [1 TAB DAILY]    aspirin 81 mg oral tablet, delayed release (enteric coated) [1 tab daily]    rosuvastatin 20 mg oral tablet [TAKE ONE TABLET BY MOUTH DAILY]    ezetimibe 10 mg oral tablet [once daily]    Jardiance 25 mg oral tablet [take 1 tablet (25 mg) by oral route once daily in the morning]    Accu-Chek Farida Plus test strips  [check blood sugar once daily  DX E11.9]        Objective:        Vitals:         Current: 5/11/2021 1:49:07 PM    Ht:  6 ft, 2 in;  Wt: 255 lbs;  BMI: 32.7T: 96.3 F (temporal);  BP: 116/63 mm Hg (right arm, sitting);  P: 68 bpm (right arm (BP Cuff), sitting);  sCr: 1.06 mg/dL;  GFR: 93.03        Exams:     PHYSICAL EXAM:     GENERAL: Vitals recorded well developed, well nourished;     NECK: range of motion is normal; thyroid is non-palpable;     RESPIRATORY: normal respiratory rate and pattern with no distress; normal breath sounds with no rales, rhonchi, wheezes or rubs;     CARDIOVASCULAR: normal rate; rhythm is regular;  no systolic murmur;     GASTROINTESTINAL: nontender; normal bowel  sounds; no masses;     LYMPHATIC: no enlargement of cervical or facial nodes; no supraclavicular nodes;     SKIN:  no significant rashes or lesions; no suspicious moles;     NEUROLOGIC: mental status: alert and oriented x 3; cranial nerves II-XII grossly intact;     PSYCHIATRIC: appropriate affect and demeanor; normal psychomotor function;         Assessment:         E11.9   Type 2 diabetes mellitus without complications       I10   Essential (primary) hypertension       E78.2   Mixed hyperlipidemia       F17.211   Nicotine dependence, cigarettes, in remission           ORDERS:         Meds Prescribed:       [Refilled] lisinopril-hydrochlorothiazide 20-25 mg oral tablet [TAKE ONE TABLET BY MOUTH DAILY], #90 (ninety) tablets, Refills: 1 (one)       [Refilled] Paxil 20 mg oral tablet [TAKE ONE TABLET BY MOUTH DAILY], #90 (ninety) tablets, Refills: 1 (one)       [Refilled] Jardiance 25 mg oral tablet [take 1 tablet (25 mg) by oral route once daily in the morning], #90 (ninety) tablets, Refills: 1 (one)       [Refilled] metFORMIN 500 mg oral Tablet, Extended Release 24 hr [TAKE TWO TABLETS BY MOUTH DAILY], #180 (one hundred and eighty) tablets, Refills: 1 (one)       [Refilled] rosuvastatin 20 mg oral tablet [TAKE ONE TABLET BY MOUTH DAILY], #90 (ninety) tablets, Refills: 1 (one)         Procedures Ordered:         Low Dose CT scan (LDCT) for lung cancer screening  (Send-Out)              Other Orders:         Counseling visit to discuss lung cancer screening using low dose CT scan  (In-House)                      Plan:         Type 2 diabetes mellitus without complications        RECOMMENDATIONS given include: Today, we have reviewed Kurt's care.  He has cut his sugar dramatically.  We will continue current medications and contact him again in 90 days for A1C.  I have encouraged him to keep the good habits that have this under better control.  No other changes.  LDCT to be arranged in for him..             Prescriptions:       [Refilled] lisinopril-hydrochlorothiazide 20-25 mg oral tablet [TAKE ONE TABLET BY MOUTH DAILY], #90 (ninety) tablets, Refills: 1 (one)       [Refilled] Paxil 20 mg oral tablet [TAKE ONE TABLET BY MOUTH DAILY], #90 (ninety) tablets, Refills: 1 (one)       [Refilled] Jardiance 25 mg oral tablet [take 1 tablet (25 mg) by oral route once daily in the morning], #90 (ninety) tablets, Refills: 1 (one)       [Refilled] metFORMIN 500 mg oral Tablet, Extended Release 24 hr [TAKE TWO TABLETS BY MOUTH DAILY], #180 (one hundred and eighty) tablets, Refills: 1 (one)       [Refilled] rosuvastatin 20 mg oral tablet [TAKE ONE TABLET BY MOUTH DAILY], #90 (ninety) tablets, Refills: 1 (one)         Essential (primary) hypertensionAs above.        Mixed hyperlipidemiaAs above.        Nicotine dependence, cigarettes, in remissionAs above.    LDCT Counseling: Discussed benefits/harms of screening, follow-up diagnostic testing, over-diagnosis, false positive rate, and total radiation exposure. Counseled on importance of adherence to annual LDCT screenings, impact of co-morbidities, and ability/willingness to undergo diagnosis and treatment. Counseled on the importance of maintaining cigarette smoking abstinence. I will order the Low Dose CT today.            Orders:         Counseling visit to discuss lung cancer screening using low dose CT scan  (In-House)              Low Dose CT scan (LDCT) for lung cancer screening  (Send-Out)                  Charge Capture:         Primary Diagnosis:     E11.9  Type 2 diabetes mellitus without complications           Orders:      [id number]  Office/outpatient visit; established patient, level 4  (In-House)              I10  Essential (primary) hypertension     E78.2  Mixed hyperlipidemia     F17.211  Nicotine dependence, cigarettes, in remission           Orders:        Counseling visit to discuss lung cancer screening using low dose CT scan  (In-House)

## 2021-05-18 NOTE — PROGRESS NOTES
Kurt Saucedo LAWR[place] 1965     Office/Outpatient Visit    Visit Date: Fri, May 25, 2018 02:27 pm    Provider: Stevan Rubio MD (Assistant: Renata Maldonado MA)    Location: [facility]        Electronically signed by Stevan Rubio MD on  05/25/2018 05:20:01 PM                             SUBJECTIVE:        CC: diabetes, cholesterol, blood pressure         HPI:         Patient presents with type 2 diabetes.  Current meds include an oral hypoglycemic ( Glucophage XR ).  He reports home blood glucose readings have been fairly good, with average fasting glucoses running in the 120-150 mg/dL range. He checks his glucose once daily at most.  Most recent lab results include Hemoglobin A1c:  6.4 (02/27/2018), LDL:  82 (02/27/2018).          Additionally, he presents with history of mixed hyperlipidemia.  current treatment includes Lipitor.  Compliance with treatment has been good; he takes his medication as directed and follows up as directed.          Essential hypertension details; his current cardiac medication regimen includes a combination medication ( Zestoretic ).  He is tolerating the medication well without side effects.  Compliance with treatment has been good; he takes his medication as directed and follows up as directed.      ROS:     CONSTITUTIONAL:  Negative for chills and fever.      CARDIOVASCULAR:  Negative for chest pain and palpitations.      RESPIRATORY:  Negative for recent cough and dyspnea.      GASTROINTESTINAL:  Negative for abdominal pain, nausea and vomiting.          PMH/FMH/SH:     Last Reviewed on 5/25/2018 02:55 PM by Stevan Rubio    Past Medical History:             PAST MEDICAL HISTORY         Hypertension     non a non b hepatitis     Hospitalizations: hepatitis         PREVENTIVE HEALTH MAINTENANCE             COLORECTAL CANCER SCREENING: Up to date (colonoscopy q10y; sigmoidoscopy q5y; Cologuard q3y) was last done 05/2017, Results are in chart; colonoscopy with  the following abnormalities noted-- tubular adenoma         Surgical History:         Circumcision: at birth;     Fracture Repair: pelvis; 1997;       Tonsillectomy/Adenoidectomy; at age 9 Procedures: colonoscopy 1989, for abd pain Treadmill stress test 2000         Social History:     Occupation:      Marital Status:          Tobacco/Alcohol/Supplements:     Last Reviewed on 5/25/2018 02:55 PM by Stevan Rubio    Tobacco: He has a past history of cigarette smoking; quit date:  02/01/17.          Alcohol: Frequency:    'once in a while';         Substance Abuse History:     Last Reviewed on 5/25/2018 02:55 PM by Stevan Rubio        Mental Health History:     Last Reviewed on 5/25/2018 02:55 PM by Stevan Rubio        Communicable Diseases (eg STDs):     Last Reviewed on 5/25/2018 02:55 PM by Stevan Rubio            Current Problems:     Last Reviewed on 5/25/2018 02:55 PM by Stevan Rubio    Type 2 diabetes     Mixed hyperlipidemia     Premature ejaculation     Essential hypertension         Immunizations:     None        Allergies:     Last Reviewed on 5/25/2018 02:55 PM by Stevan Rubio      No Known Drug Allergies.         Current Medications:     Last Reviewed on 5/25/2018 02:55 PM by Stevan Rubio    Lisinopril/Hydrochlorothiazide 20mg/25mg Tablet Take 1 tablet(s) by mouth daily     Paxil 20mg Tablet Take 1 tablet(s) by mouth daily     Atorvastatin Calcium 20mg Tablet Take 1 tablet(s) by mouth daily     Metformin HCl 500mg Tablets, Extended Release Take 2 tablet(s) by mouth daily         OBJECTIVE:        Vitals:         Current: 5/25/2018 2:29:18 PM    Ht:  6 ft, 2 in;  Wt: 250.3 lbs;  BMI: 32.1    T: 97.2 F (oral);  BP: 144/85 mm Hg (left arm, sitting);  P: 84 bpm (left arm (BP Cuff), sitting);  sCr: 1.03 mg/dL;  GFR: 98.20        Exams:     PHYSICAL EXAM:     GENERAL: Vitals recorded well developed, well nourished;     EYES:  extraocular movements intact; conjunctiva and cornea are normal; PERRL;     E/N/T: EARS:  normal external auditory canals and tympanic membranes;  grossly normal hearing; OROPHARYNX:  normal mucosa, dentition, gingiva, and posterior pharynx;     NECK: range of motion is normal; thyroid is non-palpable;     RESPIRATORY: normal respiratory rate and pattern with no distress; normal breath sounds with no rales, rhonchi, wheezes or rubs;     CARDIOVASCULAR: normal rate; rhythm is regular;  no systolic murmur;     GASTROINTESTINAL: nontender; normal bowel sounds; no masses; rectal exam: normal tone; nontender, guaiac negative stool;     GENITOURINARY: prostate:  no nodules, tenderness, or enlargement;     SKIN:  no significant rashes or lesions; no suspicious moles;     Foot exam performed.      Left foot exam    Protective sensation using Monofilament test: NORMAL sensation. Patient detects .07 grams of force which is considered normal.    Vascular status: normal peripheral vascular exam with palpable dorsal pedal and posterior tibal pulses and brisk digital capillary refill    Skin is intact without sores or ulcers    Right foot exam    Protective sensation using Monofilament test: NORMAL sensation. Patient detects .07 grams of force which is considered normal.    Vascular status: normal peripheral vascular exam with palpable dorsal pedal and posterior tibal pulses and brisk digital capillary refill    Skin is intact without sores or ulcers         ASSESSMENT           250.00   E11.9  Type 2 diabetes              DDx:     272.2   E78.2  Mixed hyperlipidemia              DDx:     401.1   I10  Essential hypertension              DDx:     V76.44   Z12.5  Screening for prostate cancer              DDx:     V76.41   Z12.12  Screening for rectal cancer              DDx:     719.45   M25.552  Hip pain              DDx:         ORDERS:         Meds Prescribed:       Refill of: Lisinopril/Hydrochlorothiazide 20mg/25mg Tablet  Take 1 tablet(s) by mouth daily  #90 (Ninety) tablet(s) Refills: 1       Refill of: Paxil (Paroxetine HCl) 20mg Tablet Take 1 tablet(s) by mouth daily  #90 (Ninety) tablet(s) Refills: 1       Refill of: Atorvastatin Calcium 20mg Tablet Take 1 tablet(s) by mouth daily  #90 (Ninety) tablet(s) Refills: 1       Refill of: Metformin HCl 500mg Tablets, Extended Release Take 2 tablet(s) by mouth daily  #180 (One [place] [address]) tablet(s) Refills: 1         Radiology/Test Orders:       [id number]  Left radiologic exam, hip, unilateral; complete, minimum of two views  (Send-Out)           Lab Orders:       [id number]  COMP [facility] Comp. Metabolic Panel  (Send-Out)         03981  A1CEG [facility] Hemoglobin A1C  (Send-Out)         [id number]  [facility] PSA Screen or Medicare screening order:   (Send-Out)         [id number]  Occult blood, fecal  (In-House)           Other Orders:       [id number]  Foot examination performed (includes examination through visual inspection, sensory exam with monofi  (In-House)                   PLAN:          Type 2 diabetes     LABORATORY:  Labs ordered to be performed today include Comprehensive metabolic panel and HgbA1C.      RECOMMENDATIONS given include: Kurt is doing fairly well at this time.  We have again reviewed his care and will check labs and go from there.  No other changes are anticipated..            Prescriptions:       Refill of: Metformin HCl 500mg Tablets, Extended Release Take 2 tablet(s) by mouth daily  #180 (One [place] [address]) tablet(s) Refills: 1           Orders:       2028F  Foot examination performed (includes examination through visual inspection, sensory exam with monofi  (In-House)         [id number]  [facility] Comp. Metabolic Panel  (Send-Out)         19498  A1CEG [facility] Hemoglobin A1C  (Send-Out)             Patient Education Handouts:       [facility] Medication Compliance           Mixed hyperlipidemia As above.           Prescriptions:       Refill of: Atorvastatin Calcium 20mg  Tablet Take 1 tablet(s) by mouth daily  #90 (Ninety) tablet(s) Refills: 1          Essential hypertension As above.           Prescriptions:       Refill of: Lisinopril/Hydrochlorothiazide 20mg/25mg Tablet Take 1 tablet(s) by mouth daily  #90 (Ninety) tablet(s) Refills: 1          Screening for prostate cancer           Orders:       [id number]  [facility] PSA Screen or Medicare screening order:   (Send-Out)            Screening for rectal cancer           Orders:       [id number]  Occult blood, fecal  (In-House)            Hip pain         RADIOLOGY:  I have ordered a left hip x-ray to be done today.            Orders:       [id number]  Left radiologic exam, hip, unilateral; complete, minimum of two views  (Send-Out)               Other Prescriptions:       Refill of: Paxil (Paroxetine HCl) 20mg Tablet Take 1 tablet(s) by mouth daily  #90 (Ninety) tablet(s) Refills: 1         CHARGE CAPTURE           **Please note: ICD descriptions below are intended for billing purposes only and may not represent clinical diagnoses**        Primary Diagnosis:         250.00 Type 2 diabetes            E11.9    Type 2 diabetes mellitus without complications              Orders:          [id number]   Office/outpatient visit; established patient, level 4  (In-House)             2028F   Foot examination performed (includes examination through visual inspection, sensory exam with monofi  (In-House)           272.2 Mixed hyperlipidemia            E78.2    Mixed hyperlipidemia    401.1 Essential hypertension            I10    Essential (primary) hypertension    V76.44 Screening for prostate cancer            Z12.5    Encounter for screening for malignant neoplasm of prostate    V76.41 Screening for rectal cancer            Z12.12    Encounter for screening for malignant neoplasm of rectum              Orders:          [id number]   Occult blood, fecal  (In-House)           719.45 Hip pain            M25.552    Pain in left hip

## 2021-05-18 NOTE — PROGRESS NOTES
Kurt Saucedo 1965     Office/Outpatient Visit    Visit Date: Tue, Jun 4, 2019 04:11 pm    Provider: Stevan Rubio MD (Assistant: Kita Mckinley RN)    Location: [facility]        Electronically signed by Stevan Rubio MD on  06/05/2019 10:34:33 AM                             SUBJECTIVE:        CC: diabetes, blood pressure, cholesterol, CAD         HPI:         Patient presents with type 2 diabetes.  Current meds include an oral hypoglycemic ( Glucophage XR ).  He reports home blood glucose readings have been a bit high, with average fasting readings in the 150-180 mg/dL range.  Most recent lab results include Hemoglobin A1c:  6.0 (%) (05/25/2018).          Dx with mixed hyperlipidemia; current treatment includes Lipitor.  Compliance with treatment has been good; he takes his medication as directed and follows up as directed.          Concerning essential hypertension, his current cardiac medication regimen includes a combination medication ( Zestoretic ).  He is tolerating the medication well without side effects.  Compliance with treatment has been good; he takes his medication as directed and follows up as directed.          Concerning coronary artery disease, Mr. Saucedo has a prior history of a myocardial infarction and is currently on a beta blocker.  His heart disease was first diagnosed 4 months ago.  The course of the disease has been stable.  Currently, his treatment regimen consists of daily 81 mg aspirin, an ACEI, a beta blocker, and Lipitor.      ROS:     CONSTITUTIONAL:  Negative for chills and fever.      CARDIOVASCULAR:  Negative for chest pain and palpitations.      RESPIRATORY:  Positive for dyspnea ( with moderate exertion ).   Negative for recent cough.      GASTROINTESTINAL:  Negative for abdominal pain, nausea and vomiting.      INTEGUMENTARY:  Negative for atypical mole(s) and rash.          PMH/FMH/SH:     Last Reviewed on 6/04/2019 04:23 PM by Stevan Rubio     Past Medical History:             PAST MEDICAL HISTORY         Coronary Artery Disease    Hypertension     non a non b hepatitis     Hospitalizations: hepatitis         PREVENTIVE HEALTH MAINTENANCE             COLORECTAL CANCER SCREENING: Up to date (colonoscopy q10y; sigmoidoscopy q5y; Cologuard q3y) was last done 05/2017, Results are in chart; colonoscopy with the following abnormalities noted-- tubular adenoma         Surgical History:         Circumcision: at birth;     Fracture Repair: pelvis; 1997;       Tonsillectomy/Adenoidectomy; at age 9 Procedures: colonoscopy 1989, for abd pain Treadmill stress test 2000         Social History:     Occupation:      Marital Status:          Tobacco/Alcohol/Supplements:     Last Reviewed on 6/04/2019 04:23 PM by Stevan Rubio    Tobacco: He has a past history of cigarette smoking; quit date:  02/01/17.          Alcohol: Frequency:    'once in a while';         Substance Abuse History:     Last Reviewed on 6/04/2019 04:23 PM by Stevan Rubio        Mental Health History:     Last Reviewed on 6/04/2019 04:23 PM by Stevan Rubio        Communicable Diseases (eg STDs):     Last Reviewed on 6/04/2019 04:23 PM by Stevan Rubio            Current Problems:     Last Reviewed on 6/04/2019 04:23 PM by Stevan Rubio    Hip pain     Type 2 diabetes     Mixed hyperlipidemia     Premature ejaculation     Essential hypertension     Screening for prostate cancer         Immunizations:     Havrix -adult dose (HepA) 12/5/2018     Havrix -adult dose (HepA) 6/1/2018     Fluzone (3 + years dose) 10/1/2018         Allergies:     Last Reviewed on 6/04/2019 04:23 PM by Stevan Rubio      No Known Drug Allergies.         Current Medications:     Last Reviewed on 6/04/2019 04:23 PM by Stevan Rubio    Lisinopril/Hydrochlorothiazide 20mg/25mg Tablet Take 1 tablet(s) by mouth daily     Paxil 20mg Tablet Take 1 tablet(s) by  mouth daily     Metformin HCl 500mg Tablets, Extended Release Take 2 tablet(s) by mouth daily     Atorvastatin Calcium 80mg Tablet 1 tab daily     Aspirin (ASA) 81mg Tablets, Enteric Coated 1 tab daily     Brilinta 90mg Tablet 1 TAB DAILY     Metoprolol Succinate 25mg Tablets, Extended Release 1 tablet BID         OBJECTIVE:        Vitals:         Current: 6/4/2019 4:18:41 PM    Ht:  6 ft, 2 in;  Wt: 268 lbs;  BMI: 34.4    T: 98.3 F (oral);  BP: 128/77 mm Hg (left arm, sitting);  P: 85 bpm (left arm (BP Cuff), sitting);  sCr: 0.89 mg/dL;  GFR: 115.72        Exams:     PHYSICAL EXAM:     GENERAL: Vitals recorded well developed, well nourished;     EYES: extraocular movements intact; conjunctiva and cornea are normal; PERRL;     E/N/T: EARS:  normal external auditory canals and tympanic membranes;  grossly normal hearing; OROPHARYNX:  normal mucosa, dentition, gingiva, and posterior pharynx;     NECK: range of motion is normal; thyroid is non-palpable;     RESPIRATORY: normal respiratory rate and pattern with no distress; normal breath sounds with no rales, rhonchi, wheezes or rubs;     CARDIOVASCULAR: normal rate; rhythm is regular;  no systolic murmur;     GASTROINTESTINAL: nontender; normal bowel sounds; no masses; rectal exam: normal tone; nontender, guaiac negative stool;     GENITOURINARY: prostate:  no nodules, tenderness, or enlargement;     SKIN:  no significant rashes or lesions; no suspicious moles;         ASSESSMENT           250.00   E11.9  Type 2 diabetes              DDx:     272.2   E78.2  Mixed hyperlipidemia              DDx:     401.1   I10  Essential hypertension              DDx:     414.01   I25.119  Coronary artery disease              DDx:     V76.44   Z12.5  Screening for prostate cancer              DDx:     V76.41   Z12.12  Screening for rectal cancer              DDx:         ORDERS:         Meds Prescribed:       Refill of: Lisinopril/Hydrochlorothiazide 20mg/25mg Tablet Take 1 tablet(s)  by mouth daily  #90 (Ninety) tablet(s) Refills: 1       Refill of: Paxil (Paroxetine HCl) 20mg Tablet Take 1 tablet(s) by mouth daily  #90 (Ninety) tablet(s) Refills: 1       Refill of: Metformin HCl 500mg Tablets, Extended Release Take 2 tablet(s) by mouth daily  #180 ([address] [place] [address]) tablet(s) Refills: 1         Radiology/Test Orders:       3017F  Colorectal CA screen results documented and reviewed (PV)  (In-House)           Lab Orders:       22211  CK - [facility]- CK total  (Send-Out)         78003  COMP - [facility] Comp. Metabolic Panel  (Send-Out)         40878  A1CEG - [facility] Hemoglobin A1C  (Send-Out)         94781  [facility] PSA Screen or Medicare screening order:   (Send-Out)         84332  Occult blood, fecal  (In-House)                   PLAN:          Type 2 diabetes     Kurt seems well today.  We will refill needed medications and arrange follow up blood work.  I suspect we may have more work to do with his diabetes.     LABORATORY:  Labs ordered to be performed today include CK, total, Comprehensive metabolic panel, and HgbA1C.  MIPS     COLORECTAL CANCER SCREENING: Results are in chart           Prescriptions:       Refill of: Metformin HCl 500mg Tablets, Extended Release Take 2 tablet(s) by mouth daily  #180 ([address] [place] [address]) tablet(s) Refills: 1           Orders:       02431  CK - [facility]- CK total  (Send-Out)         41528  COMP - [facility] Comp. Metabolic Panel  (Send-Out)         16009  A1CEG - H Hemoglobin A1C  (Send-Out)         3017F  Colorectal CA screen results documented and reviewed (PV)  (In-House)             Patient Education Handouts:       Non-Insulin Dependent Diabetes Mellitus (NIDDM)           Mixed hyperlipidemia As above.          Essential hypertension As above.           Prescriptions:       Refill of: Lisinopril/Hydrochlorothiazide 20mg/25mg Tablet Take 1 tablet(s) by mouth daily  #90 (Ninety) tablet(s) Refills: 1          Coronary artery disease As above.           Screening for prostate cancer           Orders:       [id number]  [facility] PSA Screen or Medicare screening order:   (Send-Out)            Screening for rectal cancer           Orders:       82880  Occult blood, fecal  (In-House)               Other Prescriptions:       Refill of: Paxil (Paroxetine HCl) 20mg Tablet Take 1 tablet(s) by mouth daily  #90 (Ninety) tablet(s) Refills: 1         CHARGE CAPTURE           **Please note: ICD descriptions below are intended for billing purposes only and may not represent clinical diagnoses**        Primary Diagnosis:         250.00 Type 2 diabetes            E11.9    Type 2 diabetes mellitus without complications              Orders:          77299   Office/outpatient visit; established patient, level 4  (In-House)             3017F   Colorectal CA screen results documented and reviewed (PV)  (In-House)           272.2 Mixed hyperlipidemia            E78.2    Mixed hyperlipidemia    401.1 Essential hypertension            I10    Essential (primary) hypertension    414.01 Coronary artery disease            I25.119    Atherosclerotic heart disease of native coronary artery with unspecified angina pectoris    V76.44 Screening for prostate cancer            Z12.5    Encounter for screening for malignant neoplasm of prostate    V76.41 Screening for rectal cancer            Z12.12    Encounter for screening for malignant neoplasm of rectum              Orders:          [id number]   Occult blood, fecal  (In-House)

## 2021-05-18 NOTE — PROGRESS NOTES
Kurt Saucedo TIP  1965     Office/Outpatient Visit    Visit Date: Mon, Jan 13, 2020 09:08 am    Provider: Stevan Rubio MD (Assistant: Ne Gambino LPN)    Location: [facility]        Electronically signed by Stevan Rubio MD on  01/13/2020 11:29:16 PM                             Subjective:        CC: diabetes, blood pressure, cholesterol    HPI:           Patient presents with type 2 diabetes mellitus without complications.  Current meds include an oral hypoglycemic ( Glucophage XR ).  He reports home blood glucose readings have been fairly good, with average fasting glucoses running in the 120-150 mg/dL range. He checks his glucose 1 to 2 times daily.  Most recent lab results include Hemoglobin A1c:  7.1 (%) (10/02/2019), LDL:  54 (mg/dL) (10/02/2019).            Dx with essential (primary) hypertension; his current cardiac medication regimen includes a combination medication ( Zestoretic ).  He is tolerating the medication well without side effects.  Compliance with treatment has been good; he takes his medication as directed and follows up as directed.            Mixed hyperlipidemia details; current treatment includes diet.  Compliance with treatment has been good; he takes his medication as directed and follows up as directed.  Kurt was taking a high dose of Lipitor, but that was stopped due to some elevation of his liver enzymes.          PHQ-9 Depression Screening: Completed form scanned and in chart; Total Score 0     ROS:     CONSTITUTIONAL:  Negative for chills and fever.      CARDIOVASCULAR:  Negative for chest pain and palpitations.      RESPIRATORY:  Negative for recent cough and dyspnea.      GASTROINTESTINAL:  Negative for abdominal pain, nausea and vomiting.      INTEGUMENTARY:  Negative for atypical mole(s) and rash.          Past Medical History / Family History / Social History:         Last Reviewed on 1/13/2020 09:29 AM by Stevan Rubio    Past  Medical History:             PAST MEDICAL HISTORY         Coronary Artery Disease    Hypertension     non a non b hepatitis     Hospitalizations: hepatitis         PREVENTIVE HEALTH MAINTENANCE             COLORECTAL CANCER SCREENING: Up to date (colonoscopy q10y; sigmoidoscopy q5y; Cologuard q3y) was last done 05/2017, Results are in chart; colonoscopy with the following abnormalities noted-- tubular adenoma         Surgical History:         Circumcision: at birth;     Fracture Repair: pelvis; 1997;     Joint Replacement: L Hip; 11/2019;     Tonsillectomy/Adenoidectomy; at age 9     Procedures: Treadmill stress test 2000         Family History:     Father: Arrhythmia ( Atrial Fibrillation ); Congestive Heart Failure;  COPD;  Type 2 Diabetes     Mother: Healthy         Social History:     Occupation:      Marital Status:          Tobacco/Alcohol/Supplements:     Last Reviewed on 1/13/2020 09:29 AM by Stevan Rubio    Tobacco: He has a past history of cigarette smoking; quit date:  02/01/17.          Alcohol: Frequency:    'once in a while';         Substance Abuse History:     Last Reviewed on 1/13/2020 09:29 AM by Stevan Rubio        Mental Health History:     Last Reviewed on 1/13/2020 09:29 AM by Stevan Rubio        Communicable Diseases (eg STDs):     Last Reviewed on 1/13/2020 09:29 AM by Stevan Rubio        Current Problems:     Last Reviewed on 1/13/2020 09:29 AM by Stevan Rubio    Essential (primary) hypertension    Premature ejaculation    Mixed hyperlipidemia    Encounter for screening for malignant neoplasm of prostate    Type 2 diabetes mellitus without complications    Pain in left hip    Encounter for screening for malignant neoplasm of rectum    Atherosclerotic heart disease of native coronary artery with unspecified angina pectoris        Immunizations:     Havrix -adult dose (HepA) 12/5/2018    Havrix -adult dose (HepA) 6/1/2018    Fluzone  (3 + years dose) 10/1/2018    influenza, injectable, quadrivalent 10/18/2019        Allergies:     Last Reviewed on 1/13/2020 09:29 AM by Stevan Rubio    No Known Allergies.        Current Medications:     Last Reviewed on 1/13/2020 09:29 AM by Stevan Rubio    Paxil 20 mg oral tablet [TAKE ONE TABLET BY MOUTH DAILY]    lisinopril-hydrochlorothiazide 20-25 mg oral tablet [TAKE ONE TABLET BY MOUTH DAILY]    metFORMIN 500 mg oral Tablet, Extended Release 24 hr [TAKE TWO TABLETS BY MOUTH DAILY]    Metoprolol Succinate 25 mg oral Tablet, Extended Release 24 hr [1 tablet BID]    Brilinta 90 mg oral tablet [1 TAB DAILY]    aspirin 81 mg oral tablet, delayed release (enteric coated) [1 tab daily]        Objective:        Vitals:         Current: 1/13/2020 9:13:52 AM    Ht:  6 ft, 2 in;  Wt: 260 lbs;  BMI: 33.4T: 97.9 F (oral);  BP: 140/70 mm Hg (left arm, sitting);  P: 71 bpm (left arm (BP Cuff), sitting);  sCr: 1.15 mg/dL;  GFR: 88.41        Exams:     PHYSICAL EXAM:     GENERAL: Vitals recorded well developed, obese;     NECK: range of motion is normal; thyroid is non-palpable;     RESPIRATORY: normal respiratory rate and pattern with no distress; normal breath sounds with no rales, rhonchi, wheezes or rubs;     CARDIOVASCULAR: normal rate; rhythm is regular;  no systolic murmur;     GASTROINTESTINAL: nontender; normal bowel sounds; no masses;     LYMPHATIC: no enlargement of cervical or facial nodes; no supraclavicular nodes;     SKIN:  no significant rashes or lesions; no suspicious moles;     NEUROLOGIC: mental status: alert and oriented x 3; cranial nerves II-XII grossly intact;     PSYCHIATRIC: appropriate affect and demeanor; normal psychomotor function;     Foot exam performed.      Left foot exam    Protective sensation using Monofilament test: NORMAL sensation. Patient detects .07 grams of force which is considered normal.    Vascular status: normal peripheral vascular exam with palpable  dorsal pedal and posterior tibal pulses and brisk digital capillary refill    Skin is intact without sores or ulcers    Right foot exam    Protective sensation using Monofilament test: NORMAL sensation. Patient detects .07 grams of force which is considered normal.    Vascular status: normal peripheral vascular exam with palpable dorsal pedal and posterior tibal pulses and brisk digital capillary refill    Skin is intact without sores or ulcers         Lab/Test Results:         Hemoglobin A1c: 6.3 (01/13/2020),     Performed by:: timoteo (01/13/2020),             Assessment:         E11.9   Type 2 diabetes mellitus without complications       I10   Essential (primary) hypertension       E78.2   Mixed hyperlipidemia       Z13.31   Encounter for screening for depression           ORDERS:         Meds Prescribed:       [Refilled] lisinopril-hydrochlorothiazide 20-25 mg oral tablet [TAKE ONE TABLET BY MOUTH DAILY], #90 (ninety) tablets, Refills: 1 (one)       [Refilled] Paxil 20 mg oral tablet [TAKE ONE TABLET BY MOUTH DAILY], #90 (ninety) tablets, Refills: 1 (one)       [Refilled] metFORMIN 500 mg oral Tablet, Extended Release 24 hr [TAKE TWO TABLETS BY MOUTH DAILY], #180 (one hundred and eighty) tablets, Refills: 1 (one)       [New Rx] rosuvastatin 20 mg oral tablet [take 1 tablet (20 mg) by oral route once daily], #90 (ninety) tablets, Refills: 1 (one)         Radiology/Test Orders:       3017F  Colorectal CA screen results documented and reviewed (PV)  (In-House)              Lab Orders:       [id number]*  Hgb A1c fast lab  (In-House)              Procedures Ordered:       16017  Collection of capillary blood specimen (eg, finger, heel, ear stick)  (In-House)              Other Orders:       2028F  Foot examination performed (includes examination through visual inspection, sensory exam with monofilament, and pulse exam - report when any of the three components are completed) (DM)4  (In-House)              Depression  screen negative  (In-House)                      Plan:         Type 2 diabetes mellitus without complications    LABORATORY:  Labs ordered to be performed today include Hgb A1c inhouse fast lab.      RECOMMENDATIONS given include: Kurt seems well today.  We will update an A1C as he leaves and otherwise refill his medications.  We will resume statin therapy given his CAD.  I want to be careful to not hurt the liver, but his heart disease is an ongoing risk..  MIPS PHQ-9 Depression Screening: Completed form scanned and in chart; Total Score 0; Negative Depression Screen           Prescriptions:       [Refilled] metFORMIN 500 mg oral Tablet, Extended Release 24 hr [TAKE TWO TABLETS BY MOUTH DAILY], #180 (one hundred and eighty) tablets, Refills: 1 (one)           Orders:       2028F  Foot examination performed (includes examination through visual inspection, sensory exam with monofilament, and pulse exam - report when any of the three components are completed) (DM)4  (In-House)            79392*  Hgb A1c fast lab  (In-House)            3017F  Colorectal CA screen results documented and reviewed (PV)  (In-House)            24131  Collection of capillary blood specimen (eg, finger, heel, ear stick)  (In-House)              Depression screen negative  (In-House)                Patient Education Handouts:       Non-Insulin Dependent Diabetes Mellitus (NIDDM)          Essential (primary) hypertension          Prescriptions:       [Refilled] lisinopril-hydrochlorothiazide 20-25 mg oral tablet [TAKE ONE TABLET BY MOUTH DAILY], #90 (ninety) tablets, Refills: 1 (one)         Mixed hyperlipidemia          Prescriptions:       [New Rx] rosuvastatin 20 mg oral tablet [take 1 tablet (20 mg) by oral route once daily], #90 (ninety) tablets, Refills: 1 (one)             Other Prescriptions:       [Refilled] Paxil 20 mg oral tablet [TAKE ONE TABLET BY MOUTH DAILY], #90 (ninety) tablets, Refills: 1 (one)         Charge Capture:          Primary Diagnosis:     E11.9  Type 2 diabetes mellitus without complications           Orders:      60275  Office/outpatient visit; established patient, level 4  (In-House)            2028F  Foot examination performed (includes examination through visual inspection, sensory exam with monofilament, and pulse exam - report when any of the three components are completed) (DM)4  (In-House)            25414*  Hgb A1c fast lab  (In-House)            3017F  Colorectal CA screen results documented and reviewed (PV)  (In-House)            78513  Collection of capillary blood specimen (eg, finger, heel, ear stick)  (In-House)              Depression screen negative  (In-House)              I10  Essential (primary) hypertension     E78.2  Mixed hyperlipidemia     Z13.31  Encounter for screening for depression

## 2021-05-18 NOTE — PROGRESS NOTES
Kurt Saucedo TIP  1965     Office/Outpatient Visit    Visit Date: Fri, Oct 23, 2020 10:12 am    Provider: Stevan Rubio MD (Assistant: Kita Mckinley RN)    Location: [facility]        Electronically signed by Stevan Rubio MD on  10/24/2020 07:39:03 AM                             Subjective:        CC: physical exam, diabetes, blood pressure, cholesterol, anxiety        TELEMEDICINE VISIT:    - Kurt consented to this telemedicine visit.    - Persons present during the telemedicine consultation include:  Kurt - patient, Dr. Rubio    - This visit is being conducted over FaceTime with audio and video.    HPI:       Kurt is being seen for wellness exam.  He is 55 years old and works for [employer] where he has been for a little over 6 months.  He does not smoke now.  He drinks occasionally.  He is up to date on colonoscopy.  He did have recent PSA that was normal.          Dx with essential (primary) hypertension; his current cardiac medication regimen includes a combination medication ( Zestoretic ).  Review of his blood pressure log reveals systolics in the 120s.  He is tolerating the medication well without side effects.  Compliance with treatment has been good; he takes his medication as directed and follows up as directed.            In regard to the mixed hyperlipidemia, current treatment includes Crestor and Zetia and diet.  Compliance with treatment has been good; he takes his medication as directed and follows up as directed.      ROS:     CONSTITUTIONAL:  Negative for chills and fever.      CARDIOVASCULAR:  Negative for chest pain and palpitations.      RESPIRATORY:  Negative for recent cough and dyspnea.      GASTROINTESTINAL:  Negative for abdominal pain, nausea and vomiting.      INTEGUMENTARY:  Negative for atypical mole(s) and rash.          Past Medical History / Family History / Social History:         Last Reviewed on 10/24/2020 07:20 AM by Stevan Rubio  Todd    Past Medical History:             PAST MEDICAL HISTORY         Coronary Artery Disease    Hyperlipidemia    Hypertension     Type 2 Diabetes     non a non b hepatitis     Hospitalizations: hepatitis         PREVENTIVE HEALTH MAINTENANCE             COLORECTAL CANCER SCREENING: Up to date (colonoscopy q10y; sigmoidoscopy q5y; Cologuard q3y) was last done 05/2017, Results are in chart; colonoscopy with the following abnormalities noted-- tubular adenoma         Surgical History:         Circumcision: at birth;     Fracture Repair: pelvis; 1997;     Joint Replacement: L Hip; 11/2019;     Tonsillectomy/Adenoidectomy; at age 9     Procedures: Treadmill stress test 2000         Family History:     Father: Arrhythmia ( Atrial Fibrillation ); Congestive Heart Failure;  COPD;  Type 2 Diabetes     Mother: Healthy         Social History:     Occupation:      Marital Status:          Tobacco/Alcohol/Supplements:     Last Reviewed on 10/24/2020 07:20 AM by Stevan Rubio    Tobacco: He has a past history of cigarette smoking; quit date:  02/01/17.          Alcohol: Frequency:    'once in a while';         Substance Abuse History:     Last Reviewed on 10/24/2020 07:20 AM by Stevan Rubio        Mental Health History:     Last Reviewed on 10/24/2020 07:20 AM by Stevan Rubio        Communicable Diseases (eg STDs):     Last Reviewed on 10/24/2020 07:20 AM by Stevan Rubio        Current Problems:     Last Reviewed on 10/24/2020 07:20 AM by Stevan Rubio    Essential (primary) hypertension    Premature ejaculation    Mixed hyperlipidemia    Encounter for screening for malignant neoplasm of prostate    Type 2 diabetes mellitus without complications    Pain in left hip    Atherosclerotic heart disease of native coronary artery with unspecified angina pectoris    Encounter for screening for malignant neoplasm of rectum    Encounter for screening for depression     Encounter for general adult medical examination with abnormal findings        Immunizations:     influenza, injectable, quadrivalent 10/18/2019    Havrix -adult dose (HepA) 12/5/2018    Havrix -adult dose (HepA) 6/1/2018    Fluzone (3 + years dose) 10/1/2018        Allergies:     Last Reviewed on 10/24/2020 07:20 AM by Stevan Rubio    No Known Allergies.        Current Medications:     Last Reviewed on 10/24/2020 07:20 AM by Stevan Rbuio    Metoprolol Succinate 25 mg oral Tablet, Extended Release 24 hr [1 tablet BID]    Brilinta 90 mg oral tablet [1 TAB DAILY]    aspirin 81 mg oral tablet, delayed release (enteric coated) [1 tab daily]    ezetimibe 10 mg oral tablet [once daily]    Paxil 20 mg oral tablet [TAKE ONE TABLET BY MOUTH DAILY]    lisinopril-hydrochlorothiazide 20-25 mg oral tablet [TAKE ONE TABLET BY MOUTH DAILY]    metFORMIN 500 mg oral Tablet, Extended Release 24 hr [TAKE TWO TABLETS BY MOUTH DAILY]    rosuvastatin 20 mg oral tablet [TAKE ONE TABLET BY MOUTH DAILY]    Jardiance 10 mg oral tablet [take 1 tablet (10 mg) by oral route once daily in the morning]    Jardiance 25 mg oral tablet [take 1 tablet (25 mg) by oral route once daily in the morning]        Objective:        Exams:     PHYSICAL EXAM:     GENERAL: well developed, obese;     EYES: extraocular movements intact; conjunctiva and cornea are normal; PERRL;     NECK: range of motion is normal; thyroid is non-palpable; no visible finding;     RESPIRATORY: normal respiratory rate and pattern with no distress;     LYMPHATIC: no enlargement of cervical or facial nodes; no supraclavicular nodes;     SKIN:  no significant rashes or lesions; no suspicious moles;     NEUROLOGIC: mental status: alert and oriented x 3; cranial nerves II-XII grossly intact;     PSYCHIATRIC: appropriate affect and demeanor; normal psychomotor function;         Assessment:         Z00.01   Encounter for general adult medical examination with abnormal  findings       E11.9   Type 2 diabetes mellitus without complications       I10   Essential (primary) hypertension       E78.2   Mixed hyperlipidemia       I25.119   Atherosclerotic heart disease of native coronary artery with unspecified angina pectoris           ORDERS:         Radiology/Test Orders:       3017F  Colorectal CA screen results documented and reviewed (PV)  (In-House)                      Plan:         Encounter for general adult medical examination with abnormal findings        RECOMMENDATIONS given include: Today, we have reviewed Kurt's care in detail.  Exam is limited due to telehealth format.  Overall, I think he is doing okay.  However, his sugar has been running much too high.  Risks related to this are plainly discussed including recurrent heart disease.  Given known CAD, I do think adding Jardiance is reasonable.  We will start that as per the medication list and contact him in about 3 weeks to see how his sugars are doing.  Risks of Jardiance are reviewed including potential for urinary tract infection.  His other cardiac risk factors are fairly well controlled including the cholesterol.  We will refill his usual medications as well as noted.  He is up to date on other health related screenings otherwise..  [facility] Colorectal Cancer Screening is up to date and the results are in the chart           Orders:       3017F  Colorectal CA screen results documented and reviewed (PV)  (In-House)              Type 2 diabetes mellitus without complicationsAs above.        Essential (primary) hypertensionAs above.        Mixed hyperlipidemiaAs above.        Atherosclerotic heart disease of native coronary artery with unspecified angina pectorisAs above.            Charge Capture:         Primary Diagnosis:     Z00.01  Encounter for general adult medical examination with abnormal findings           Orders:      79211  Preventive medicine, established patient, age 40-64 years  (In-House)             3017F  Colorectal CA screen results documented and reviewed (PV)  (In-House)              E11.9  Type 2 diabetes mellitus without complications     I10  Essential (primary) hypertension     E78.2  Mixed hyperlipidemia     I25.119  Atherosclerotic heart disease of native coronary artery with unspecified angina pectoris

## 2021-07-01 VITALS
DIASTOLIC BLOOD PRESSURE: 75 MMHG | HEIGHT: 74 IN | BODY MASS INDEX: 33.96 KG/M2 | SYSTOLIC BLOOD PRESSURE: 133 MMHG | TEMPERATURE: 97.8 F | HEART RATE: 61 BPM | WEIGHT: 264.6 LBS

## 2021-07-01 VITALS
SYSTOLIC BLOOD PRESSURE: 144 MMHG | BODY MASS INDEX: 32.12 KG/M2 | HEIGHT: 74 IN | WEIGHT: 250.3 LBS | TEMPERATURE: 97.2 F | DIASTOLIC BLOOD PRESSURE: 85 MMHG | HEART RATE: 84 BPM

## 2021-07-01 VITALS
HEIGHT: 74 IN | SYSTOLIC BLOOD PRESSURE: 128 MMHG | BODY MASS INDEX: 34.39 KG/M2 | WEIGHT: 268 LBS | DIASTOLIC BLOOD PRESSURE: 77 MMHG | TEMPERATURE: 98.3 F | HEART RATE: 85 BPM

## 2021-07-02 VITALS
TEMPERATURE: 96.3 F | HEIGHT: 74 IN | WEIGHT: 255 LBS | SYSTOLIC BLOOD PRESSURE: 116 MMHG | HEART RATE: 68 BPM | BODY MASS INDEX: 32.73 KG/M2 | DIASTOLIC BLOOD PRESSURE: 63 MMHG

## 2021-07-02 VITALS
HEART RATE: 71 BPM | BODY MASS INDEX: 33.37 KG/M2 | SYSTOLIC BLOOD PRESSURE: 140 MMHG | TEMPERATURE: 97.9 F | DIASTOLIC BLOOD PRESSURE: 70 MMHG | WEIGHT: 260 LBS | HEIGHT: 74 IN

## 2021-08-27 RX ORDER — EZETIMIBE 10 MG/1
TABLET ORAL
Qty: 90 TABLET | Refills: 1 | Status: SHIPPED | OUTPATIENT
Start: 2021-08-27 | End: 2022-03-08 | Stop reason: SDUPTHER

## 2021-09-16 ENCOUNTER — TELEPHONE (OUTPATIENT)
Dept: FAMILY MEDICINE CLINIC | Age: 56
End: 2021-09-16

## 2021-09-17 ENCOUNTER — CLINICAL SUPPORT (OUTPATIENT)
Dept: FAMILY MEDICINE CLINIC | Age: 56
End: 2021-09-17

## 2021-09-17 VITALS — DIASTOLIC BLOOD PRESSURE: 71 MMHG | SYSTOLIC BLOOD PRESSURE: 126 MMHG | HEART RATE: 77 BPM

## 2021-09-17 DIAGNOSIS — E11.9 TYPE 2 DIABETES MELLITUS WITHOUT COMPLICATION, UNSPECIFIED WHETHER LONG TERM INSULIN USE (HCC): Primary | ICD-10-CM

## 2021-09-17 LAB — HBA1C MFR BLD: 6.9 %

## 2021-10-04 PROBLEM — E78.5 HYPERLIPIDEMIA LDL GOAL <70: Status: ACTIVE | Noted: 2021-10-04

## 2021-10-04 PROBLEM — I10 ESSENTIAL HYPERTENSION: Status: ACTIVE | Noted: 2021-10-04

## 2021-10-04 PROBLEM — I25.10 CORONARY ARTERY DISEASE INVOLVING NATIVE CORONARY ARTERY OF NATIVE HEART WITHOUT ANGINA PECTORIS: Status: ACTIVE | Noted: 2021-10-04

## 2021-10-04 NOTE — PATIENT INSTRUCTIONS
"Low-Sodium Eating Plan  Sodium, which is an element that makes up salt, helps you maintain a healthy balance of fluids in your body. Too much sodium can increase your blood pressure and cause fluid and waste to be held in your body.  Your health care provider or dietitian may recommend following this plan if you have high blood pressure (hypertension), kidney disease, liver disease, or heart failure. Eating less sodium can help lower your blood pressure, reduce swelling, and protect your heart, liver, and kidneys.  What are tips for following this plan?  Reading food labels  · The Nutrition Facts label lists the amount of sodium in one serving of the food. If you eat more than one serving, you must multiply the listed amount of sodium by the number of servings.  · Choose foods with less than 140 mg of sodium per serving.  · Avoid foods with 300 mg of sodium or more per serving.  Shopping    · Look for lower-sodium products, often labeled as \"low-sodium\" or \"no salt added.\"  · Always check the sodium content, even if foods are labeled as \"unsalted\" or \"no salt added.\"  · Buy fresh foods.  ? Avoid canned foods and pre-made or frozen meals.  ? Avoid canned, cured, or processed meats.  · Buy breads that have less than 80 mg of sodium per slice.    Cooking    · Eat more home-cooked food and less restaurant, buffet, and fast food.  · Avoid adding salt when cooking. Use salt-free seasonings or herbs instead of table salt or sea salt. Check with your health care provider or pharmacist before using salt substitutes.  · Cook with plant-based oils, such as canola, sunflower, or olive oil.    Meal planning  · When eating at a restaurant, ask that your food be prepared with less salt or no salt, if possible. Avoid dishes labeled as brined, pickled, cured, smoked, or made with soy sauce, miso, or teriyaki sauce.  · Avoid foods that contain MSG (monosodium glutamate). MSG is sometimes added to Chinese food, bouillon, and some " "canned foods.  · Make meals that can be grilled, baked, poached, roasted, or steamed. These are generally made with less sodium.  General information  Most people on this plan should limit their sodium intake to 1,500-2,000 mg (milligrams) of sodium each day.  What foods should I eat?  Fruits  Fresh, frozen, or canned fruit. Fruit juice.  Vegetables  Fresh or frozen vegetables. \"No salt added\" canned vegetables. \"No salt added\" tomato sauce and paste. Low-sodium or reduced-sodium tomato and vegetable juice.  Grains  Low-sodium cereals, including oats, puffed wheat and rice, and shredded wheat. Low-sodium crackers. Unsalted rice. Unsalted pasta. Low-sodium bread. Whole-grain breads and whole-grain pasta.  Meats and other proteins  Fresh or frozen (no salt added) meat, poultry, seafood, and fish. Low-sodium canned tuna and salmon. Unsalted nuts. Dried peas, beans, and lentils without added salt. Unsalted canned beans. Eggs. Unsalted nut butters.  Dairy  Milk. Soy milk. Cheese that is naturally low in sodium, such as ricotta cheese, fresh mozzarella, or Swiss cheese. Low-sodium or reduced-sodium cheese. Cream cheese. Yogurt.  Seasonings and condiments  Fresh and dried herbs and spices. Salt-free seasonings. Low-sodium mustard and ketchup. Sodium-free salad dressing. Sodium-free light mayonnaise. Fresh or refrigerated horseradish. Lemon juice. Vinegar.  Other foods  Homemade, reduced-sodium, or low-sodium soups. Unsalted popcorn and pretzels. Low-salt or salt-free chips.  The items listed above may not be a complete list of foods and beverages you can eat. Contact a dietitian for more information.  What foods should I avoid?  Vegetables  Sauerkraut, pickled vegetables, and relishes. Olives. French fries. Onion rings. Regular canned vegetables (not low-sodium or reduced-sodium). Regular canned tomato sauce and paste (not low-sodium or reduced-sodium). Regular tomato and vegetable juice (not low-sodium or reduced-sodium). " Frozen vegetables in sauces.  Grains  Instant hot cereals. Bread stuffing, pancake, and biscuit mixes. Croutons. Seasoned rice or pasta mixes. Noodle soup cups. Boxed or frozen macaroni and cheese. Regular salted crackers. Self-rising flour.  Meats and other proteins  Meat or fish that is salted, canned, smoked, spiced, or pickled. Precooked or cured meat, such as sausages or meat loaves. Mendez. Ham. Pepperoni. Hot dogs. Corned beef. Chipped beef. Salt pork. Jerky. Pickled herring. Anchovies and sardines. Regular canned tuna. Salted nuts.  Dairy  Processed cheese and cheese spreads. Hard cheeses. Cheese curds. Blue cheese. Feta cheese. String cheese. Regular cottage cheese. Buttermilk. Canned milk.  Fats and oils  Salted butter. Regular margarine. Ghee. Mendez fat.  Seasonings and condiments  Onion salt, garlic salt, seasoned salt, table salt, and sea salt. Canned and packaged gravies. Worcestershire sauce. Tartar sauce. Barbecue sauce. Teriyaki sauce. Soy sauce, including reduced-sodium. Steak sauce. Fish sauce. Oyster sauce. Cocktail sauce. Horseradish that you find on the shelf. Regular ketchup and mustard. Meat flavorings and tenderizers. Bouillon cubes. Hot sauce. Pre-made or packaged marinades. Pre-made or packaged taco seasonings. Relishes. Regular salad dressings. Salsa.  Other foods  Salted popcorn and pretzels. Corn chips and puffs. Potato and tortilla chips. Canned or dried soups. Pizza. Frozen entrees and pot pies.  The items listed above may not be a complete list of foods and beverages you should avoid. Contact a dietitian for more information.  Summary  · Eating less sodium can help lower your blood pressure, reduce swelling, and protect your heart, liver, and kidneys.  · Most people on this plan should limit their sodium intake to 1,500-2,000 mg (milligrams) of sodium each day.  · Canned, boxed, and frozen foods are high in sodium. Restaurant foods, fast foods, and pizza are also very high in sodium.  You also get sodium by adding salt to food.  · Try to cook at home, eat more fresh fruits and vegetables, and eat less fast food and canned, processed, or prepared foods.  This information is not intended to replace advice given to you by your health care provider. Make sure you discuss any questions you have with your health care provider.  Document Revised: 01/22/2021 Document Reviewed: 11/18/2020  [employer] Patient Education © 2021 [employer]      Cholesterol Content in Foods  Cholesterol is a waxy, fat-like substance that helps to carry fat in the blood. The body needs cholesterol in small amounts, but too much cholesterol can cause damage to the arteries and heart.  Most people should eat less than 200 milligrams (mg) of cholesterol a day.  Foods with cholesterol    Cholesterol is found in animal-based foods, such as meat, seafood, and dairy. Generally, low-fat dairy and lean meats have less cholesterol than full-fat dairy and fatty meats. The milligrams of cholesterol per serving (mg per serving) of common cholesterol-containing foods are listed below.  Meat and other proteins  · Egg -- one large whole egg has 186 mg.  · Veal shank -- 4 oz has 141 mg.  · Lean ground turkey (93% lean) -- 4 oz has 118 mg.  · Fat-trimmed lamb loin -- 4 oz has 106 mg.  · Lean ground beef (90% lean) -- 4 oz has 100 mg.  · Lobster -- 3.5 oz has 90 mg.  · Pork loin chops -- 4 oz has 86 mg.  · Canned salmon -- 3.5 oz has 83 mg.  · Fat-trimmed beef top loin -- 4 oz has 78 mg.  · Frankfurter -- 1 suzanne (3.5 oz) has 77 mg.  · Crab -- 3.5 oz has 71 mg.  · Roasted chicken without skin, white meat -- 4 oz has 66 mg.  · Light bologna -- 2 oz has 45 mg.  · Deli-cut turkey -- 2 oz has 31 mg.  · Canned tuna -- 3.5 oz has 31 mg.  · Mendez -- 1 oz has 29 mg.  · Oysters and mussels (raw) -- 3.5 oz has 25 mg.  · Mackerel -- 1 oz has 22 mg.  · Trout -- 1 oz has 20 mg.  · Pork sausage -- 1 link (1 oz) has 17 mg.  · Spring -- 1 oz has 16  mg.  · Tilapia -- 1 oz has 14 mg.  Dairy  · Soft-serve ice cream -- ½ cup (4 oz) has 103 mg.  · Whole-milk yogurt -- 1 cup (8 oz) has 29 mg.  · Cheddar cheese -- 1 oz has 28 mg.  · American cheese -- 1 oz has 28 mg.  · Whole milk -- 1 cup (8 oz) has 23 mg.  · 2% milk -- 1 cup (8 oz) has 18 mg.  · Cream cheese -- 1 tablespoon (Tbsp) has 15 mg.  · Cottage cheese -- ½ cup (4 oz) has 14 mg.  · Low-fat (1%) milk -- 1 cup (8 oz) has 10 mg.  · Sour cream -- 1 Tbsp has 8.5 mg.  · Low-fat yogurt -- 1 cup (8 oz) has 8 mg.  · Nonfat Greek yogurt -- 1 cup (8 oz) has 7 mg.  · Half-and-half cream -- 1 Tbsp has 5 mg.  Fats and oils  · Cod liver oil -- 1 tablespoon (Tbsp) has 82 mg.  · Butter -- 1 Tbsp has 15 mg.  · Lard -- 1 Tbsp has 14 mg.  · Mendez grease -- 1 Tbsp has 14 mg.  · Mayonnaise -- 1 Tbsp has 5-10 mg.  · Margarine -- 1 Tbsp has 3-10 mg.  Exact amounts of cholesterol in these foods may vary depending on specific ingredients and brands.  Foods without cholesterol  Most plant-based foods do not have cholesterol unless you combine them with a food that has cholesterol. Foods without cholesterol include:  · Grains and cereals.  · Vegetables.  · Fruits.  · Vegetable oils, such as olive, canola, and sunflower oil.  · Legumes, such as peas, beans, and lentils.  · Nuts and seeds.  · Egg whites.  Summary  · The body needs cholesterol in small amounts, but too much cholesterol can cause damage to the arteries and heart.  · Most people should eat less than 200 milligrams (mg) of cholesterol a day.  This information is not intended to replace advice given to you by your health care provider. Make sure you discuss any questions you have with your health care provider.  Document Revised: 05/10/2021 Document Reviewed: 05/10/2021  Elsevier Patient Education © 2021 [employer]

## 2021-10-14 ENCOUNTER — OFFICE VISIT (OUTPATIENT)
Dept: CARDIOLOGY | Facility: CLINIC | Age: 56
End: 2021-10-14

## 2021-10-14 VITALS
WEIGHT: 253 LBS | SYSTOLIC BLOOD PRESSURE: 135 MMHG | BODY MASS INDEX: 32.47 KG/M2 | HEIGHT: 74 IN | DIASTOLIC BLOOD PRESSURE: 71 MMHG | HEART RATE: 80 BPM

## 2021-10-14 DIAGNOSIS — I25.10 CORONARY ARTERY DISEASE INVOLVING NATIVE CORONARY ARTERY OF NATIVE HEART WITHOUT ANGINA PECTORIS: Primary | ICD-10-CM

## 2021-10-14 DIAGNOSIS — I10 ESSENTIAL HYPERTENSION: ICD-10-CM

## 2021-10-14 DIAGNOSIS — E78.5 HYPERLIPIDEMIA LDL GOAL <70: ICD-10-CM

## 2021-10-14 PROCEDURE — 99214 OFFICE O/P EST MOD 30 MIN: CPT | Performed by: INTERNAL MEDICINE

## 2021-10-14 RX ORDER — ROSUVASTATIN CALCIUM 20 MG/1
20 TABLET, COATED ORAL DAILY
COMMUNITY
Start: 2021-08-11 | End: 2021-12-09 | Stop reason: SDUPTHER

## 2021-10-14 RX ORDER — ASPIRIN 81 MG/1
81 TABLET ORAL DAILY
COMMUNITY

## 2021-10-14 RX ORDER — METFORMIN HYDROCHLORIDE 500 MG/1
TABLET, EXTENDED RELEASE ORAL 2 TIMES DAILY
COMMUNITY
Start: 2021-09-08 | End: 2021-11-16

## 2021-10-14 RX ORDER — LISINOPRIL AND HYDROCHLOROTHIAZIDE 25; 20 MG/1; MG/1
TABLET ORAL
COMMUNITY
Start: 2021-08-23 | End: 2021-11-16

## 2021-10-14 RX ORDER — PAROXETINE HYDROCHLORIDE 20 MG/1
TABLET, FILM COATED ORAL
COMMUNITY
Start: 2021-09-01 | End: 2021-11-16

## 2021-10-14 RX ORDER — EMPAGLIFLOZIN 25 MG/1
TABLET, FILM COATED ORAL
COMMUNITY
Start: 2021-09-14 | End: 2021-11-16

## 2021-10-14 RX ORDER — METOPROLOL SUCCINATE 25 MG/1
TABLET, EXTENDED RELEASE ORAL 2 TIMES DAILY
COMMUNITY
Start: 2021-09-01 | End: 2022-04-28 | Stop reason: SDUPTHER

## 2021-10-14 NOTE — ASSESSMENT & PLAN NOTE
Blood pressure well controlled continue with lisinopril HCTZ 20/25 and Toprol 25 mg once a day.  Counseled patient on  • low-sodium diet of less than 2 g  • Aerobic activity 30 minutes a day 5 times a week  • Weight loss

## 2021-10-14 NOTE — PROGRESS NOTES
"Chief Complaint  Coronary Artery Disease    Subjective    Patient is doing well denies any chest pain shortness of breath or myalgias symptoms    Past Medical History:   Diagnosis Date   • CAD 10/4/2021    non-ST-elevation myocardial infarction, 100% occlusion of the RCA   • Essential hypertension 10/4/2021   • Hyperlipidemia LDL goal <70 10/4/2021         Current Outpatient Medications:   •  aspirin 81 MG EC tablet, Take 81 mg by mouth Daily., Disp: , Rfl:   •  ezetimibe (ZETIA) 10 MG tablet, TAKE ONE TABLET BY MOUTH EVERY 24 HOURS, Disp: 90 tablet, Rfl: 1  •  Jardiance 25 MG tablet tablet, , Disp: , Rfl:   •  lisinopril-hydrochlorothiazide (PRINZIDE,ZESTORETIC) 20-25 MG per tablet, , Disp: , Rfl:   •  metFORMIN ER (GLUCOPHAGE-XR) 500 MG 24 hr tablet, 2 (two) times a day., Disp: , Rfl:   •  metoprolol succinate XL (TOPROL-XL) 25 MG 24 hr tablet, 2 (two) times a day., Disp: , Rfl:   •  PARoxetine (PAXIL) 20 MG tablet, , Disp: , Rfl:   •  rosuvastatin (CRESTOR) 20 MG tablet, , Disp: , Rfl:   •  ticagrelor (Brilinta) 60 MG tablet tablet, Take 1 tablet by mouth 2 (Two) Times a Day., Disp: 180 tablet, Rfl: 3    Medications Discontinued During This Encounter   Medication Reason   • ticagrelor (Brilinta) 90 MG tablet tablet      No Known Allergies     Social History     Tobacco Use   • Smoking status: Former Smoker   • Smokeless tobacco: Former User     Types: Snuff   Vaping Use   • Vaping Use: Never used   Substance Use Topics   • Alcohol use: Yes   • Drug use: Never       History reviewed. No pertinent family history.     Objective     /71 (BP Location: Right arm, Patient Position: Sitting)   Pulse 80   Ht 188 cm (74\")   Wt 115 kg (253 lb)   BMI 32.48 kg/m²       Physical Exam    General Appearance:   · no acute distress  · Alert and oriented x3  HENT:   · lips not cyanotic  · Atraumatic  Neck:  · No jvd   · supple  Respiratory:  · no respiratory distress  · normal breath sounds  · no " rales  Cardiovascular:  · Regular rate and rhythm  · no S3, no S4   · no murmur  · no rub  Extremities  · No cyanosis  · lower extremity edema: none    Skin:   · warm, dry  · No rashes      Result Review :     No results found for: PROBNP  CMP    CMP 5/7/21   Glucose 121 (A)   BUN 13   Creatinine 1.06   Sodium 138   Potassium 4.1   Chloride 98 (A)   Calcium 9.6   Albumin 5.2 (A)   Total Bilirubin 0.73   Alkaline Phosphatase 66   AST (SGOT) 38   ALT (SGPT) 48 (A)   (A) Abnormal value            CBC w/diff    CBC w/Diff 5/7/21   WBC 10.80   RBC 5.48   Hemoglobin 16.90   Hematocrit 50.7   MCV 92.5   MCH 30.8   MCHC 33.3   RDW 12.5   Platelets 331.00            Lab Results   Component Value Date    TSH 1.160 10/16/2020      Lab Results   Component Value Date    FREET4 1.1 02/20/2019      No results found for: DDIMERQUANT  Magnesium   Date Value Ref Range Status   02/18/2019 2.05 1.60 - 2.30 mg/dL Final      No results found for: DIGOXIN   Lab Results   Component Value Date    TROPONINT 0.56 (H) 02/20/2019           Lipid Panel    Lipid Panel 5/7/21   Total Cholesterol 131   Triglycerides 238 (A)   HDL Cholesterol 36 (A)   VLDL Cholesterol 48 (A)   LDL Cholesterol  47 (A)   (A) Abnormal value       Comments are available for some flowsheets but are not being displayed.           Lab Results   Component Value Date    POCTROP 0.00 02/18/2019                      Diagnoses and all orders for this visit:    1. CAD (Primary)  Assessment & Plan:  No angina can decrease chronic Brilinta therapy to 60 mg twice daily and maintain aspirin 81 daily      2. Essential hypertension  Assessment & Plan:  Blood pressure well controlled continue with lisinopril HCTZ 20/25 and Toprol 25 mg once a day.  Counseled patient on  • low-sodium diet of less than 2 g  • Aerobic activity 30 minutes a day 5 times a week  • Weight loss          3. Hyperlipidemia LDL goal <70  Assessment & Plan:  Continue on Crestor 20 and Zetia 10 mg once a day LDL  below 70      Other orders  -     ticagrelor (Brilinta) 60 MG tablet tablet; Take 1 tablet by mouth 2 (Two) Times a Day.  Dispense: 180 tablet; Refill: 3          Follow Up     Return in about 6 months (around 4/14/2022) for EKG with F/U.          Patient was given instructions and counseling regarding his condition or for health maintenance advice. Please see specific information pulled into the AVS if appropriate.

## 2021-10-14 NOTE — ASSESSMENT & PLAN NOTE
No angina can decrease chronic Brilinta therapy to 60 mg twice daily and maintain aspirin 81 daily

## 2021-11-16 RX ORDER — PAROXETINE HYDROCHLORIDE 20 MG/1
TABLET, FILM COATED ORAL
Qty: 90 TABLET | Refills: 0 | Status: SHIPPED | OUTPATIENT
Start: 2021-11-16 | End: 2021-12-09 | Stop reason: SDUPTHER

## 2021-11-16 RX ORDER — LISINOPRIL AND HYDROCHLOROTHIAZIDE 25; 20 MG/1; MG/1
TABLET ORAL
Qty: 90 TABLET | Refills: 0 | Status: SHIPPED | OUTPATIENT
Start: 2021-11-16 | End: 2021-12-09 | Stop reason: SDUPTHER

## 2021-11-16 RX ORDER — METFORMIN HYDROCHLORIDE 500 MG/1
TABLET, EXTENDED RELEASE ORAL
Qty: 180 TABLET | Refills: 0 | Status: SHIPPED | OUTPATIENT
Start: 2021-11-16 | End: 2021-12-09 | Stop reason: SDUPTHER

## 2021-11-16 RX ORDER — EMPAGLIFLOZIN 25 MG/1
TABLET, FILM COATED ORAL
Qty: 90 TABLET | Refills: 0 | Status: SHIPPED | OUTPATIENT
Start: 2021-11-16 | End: 2021-12-09 | Stop reason: SDUPTHER

## 2021-11-16 NOTE — TELEPHONE ENCOUNTER
I did authorize refills on these medications.  However, I would recommend he schedule follow-up.  It is important we see patients with diabetes at least a couple of times a year.  Thanks.

## 2021-12-09 ENCOUNTER — LAB (OUTPATIENT)
Dept: LAB | Facility: HOSPITAL | Age: 56
End: 2021-12-09

## 2021-12-09 ENCOUNTER — OFFICE VISIT (OUTPATIENT)
Dept: FAMILY MEDICINE CLINIC | Age: 56
End: 2021-12-09

## 2021-12-09 VITALS
SYSTOLIC BLOOD PRESSURE: 124 MMHG | TEMPERATURE: 98.3 F | BODY MASS INDEX: 33.24 KG/M2 | WEIGHT: 259 LBS | HEART RATE: 72 BPM | HEIGHT: 74 IN | DIASTOLIC BLOOD PRESSURE: 76 MMHG

## 2021-12-09 DIAGNOSIS — I10 ESSENTIAL HYPERTENSION: ICD-10-CM

## 2021-12-09 DIAGNOSIS — Z11.59 NEED FOR HEPATITIS C SCREENING TEST: ICD-10-CM

## 2021-12-09 DIAGNOSIS — E11.59 TYPE 2 DIABETES MELLITUS WITH OTHER CIRCULATORY COMPLICATION, WITHOUT LONG-TERM CURRENT USE OF INSULIN (HCC): ICD-10-CM

## 2021-12-09 DIAGNOSIS — F17.210 NICOTINE DEPENDENCE, CIGARETTES, UNCOMPLICATED: ICD-10-CM

## 2021-12-09 DIAGNOSIS — Z23 ENCOUNTER FOR IMMUNIZATION: ICD-10-CM

## 2021-12-09 DIAGNOSIS — E78.5 HYPERLIPIDEMIA LDL GOAL <70: ICD-10-CM

## 2021-12-09 DIAGNOSIS — Z12.5 SCREENING FOR PROSTATE CANCER: ICD-10-CM

## 2021-12-09 DIAGNOSIS — I25.10 CORONARY ARTERY DISEASE INVOLVING NATIVE CORONARY ARTERY OF NATIVE HEART WITHOUT ANGINA PECTORIS: ICD-10-CM

## 2021-12-09 DIAGNOSIS — E11.59 TYPE 2 DIABETES MELLITUS WITH OTHER CIRCULATORY COMPLICATION, WITHOUT LONG-TERM CURRENT USE OF INSULIN (HCC): Primary | ICD-10-CM

## 2021-12-09 LAB
ALBUMIN SERPL-MCNC: 4.9 G/DL (ref 3.5–5.2)
ALBUMIN/GLOB SERPL: 2 G/DL
ALP SERPL-CCNC: 70 U/L (ref 39–117)
ALT SERPL W P-5'-P-CCNC: 38 U/L (ref 1–41)
ANION GAP SERPL CALCULATED.3IONS-SCNC: 13.2 MMOL/L (ref 5–15)
AST SERPL-CCNC: 27 U/L (ref 1–40)
BILIRUB SERPL-MCNC: 0.5 MG/DL (ref 0–1.2)
BUN SERPL-MCNC: 14 MG/DL (ref 6–20)
BUN/CREAT SERPL: 15.9 (ref 7–25)
CALCIUM SPEC-SCNC: 9.5 MG/DL (ref 8.6–10.5)
CHLORIDE SERPL-SCNC: 99 MMOL/L (ref 98–107)
CO2 SERPL-SCNC: 23.8 MMOL/L (ref 22–29)
CREAT SERPL-MCNC: 0.88 MG/DL (ref 0.76–1.27)
DEPRECATED RDW RBC AUTO: 41.3 FL (ref 37–54)
ERYTHROCYTE [DISTWIDTH] IN BLOOD BY AUTOMATED COUNT: 12.4 % (ref 12.3–15.4)
GFR SERPL CREATININE-BSD FRML MDRD: 90 ML/MIN/1.73
GLOBULIN UR ELPH-MCNC: 2.5 GM/DL
GLUCOSE SERPL-MCNC: 127 MG/DL (ref 65–99)
HBA1C MFR BLD: 7.78 % (ref 4.8–5.6)
HCT VFR BLD AUTO: 47.8 % (ref 37.5–51)
HCV AB SER DONR QL: NORMAL
HGB BLD-MCNC: 16.1 G/DL (ref 13–17.7)
MCH RBC QN AUTO: 30.4 PG (ref 26.6–33)
MCHC RBC AUTO-ENTMCNC: 33.7 G/DL (ref 31.5–35.7)
MCV RBC AUTO: 90.2 FL (ref 79–97)
PLATELET # BLD AUTO: 348 10*3/MM3 (ref 140–450)
PMV BLD AUTO: 8.8 FL (ref 6–12)
POTASSIUM SERPL-SCNC: 4 MMOL/L (ref 3.5–5.2)
PROT SERPL-MCNC: 7.4 G/DL (ref 6–8.5)
PSA SERPL-MCNC: 0.45 NG/ML (ref 0–4)
RBC # BLD AUTO: 5.3 10*6/MM3 (ref 4.14–5.8)
SODIUM SERPL-SCNC: 136 MMOL/L (ref 136–145)
TSH SERPL DL<=0.05 MIU/L-ACNC: 1.05 UIU/ML (ref 0.27–4.2)
WBC NRBC COR # BLD: 10.11 10*3/MM3 (ref 3.4–10.8)

## 2021-12-09 PROCEDURE — 36415 COLL VENOUS BLD VENIPUNCTURE: CPT

## 2021-12-09 PROCEDURE — 99214 OFFICE O/P EST MOD 30 MIN: CPT | Performed by: FAMILY MEDICINE

## 2021-12-09 PROCEDURE — 85027 COMPLETE CBC AUTOMATED: CPT

## 2021-12-09 PROCEDURE — 90732 PPSV23 VACC 2 YRS+ SUBQ/IM: CPT | Performed by: FAMILY MEDICINE

## 2021-12-09 PROCEDURE — G0103 PSA SCREENING: HCPCS

## 2021-12-09 PROCEDURE — 83036 HEMOGLOBIN GLYCOSYLATED A1C: CPT

## 2021-12-09 PROCEDURE — 86803 HEPATITIS C AB TEST: CPT

## 2021-12-09 PROCEDURE — 90471 IMMUNIZATION ADMIN: CPT | Performed by: FAMILY MEDICINE

## 2021-12-09 PROCEDURE — 84443 ASSAY THYROID STIM HORMONE: CPT

## 2021-12-09 PROCEDURE — 80053 COMPREHEN METABOLIC PANEL: CPT

## 2021-12-09 RX ORDER — ROSUVASTATIN CALCIUM 20 MG/1
20 TABLET, COATED ORAL DAILY
Qty: 90 TABLET | Refills: 1 | Status: SHIPPED | OUTPATIENT
Start: 2021-12-09 | End: 2022-04-28 | Stop reason: SDUPTHER

## 2021-12-09 RX ORDER — LISINOPRIL AND HYDROCHLOROTHIAZIDE 25; 20 MG/1; MG/1
1 TABLET ORAL DAILY
Qty: 90 TABLET | Refills: 1 | Status: SHIPPED | OUTPATIENT
Start: 2021-12-09 | End: 2022-06-09 | Stop reason: SDUPTHER

## 2021-12-09 RX ORDER — METFORMIN HYDROCHLORIDE 500 MG/1
1000 TABLET, EXTENDED RELEASE ORAL DAILY
Qty: 180 TABLET | Refills: 1 | Status: SHIPPED | OUTPATIENT
Start: 2021-12-09 | End: 2022-06-09 | Stop reason: SDUPTHER

## 2021-12-09 RX ORDER — PAROXETINE HYDROCHLORIDE 20 MG/1
20 TABLET, FILM COATED ORAL DAILY
Qty: 90 TABLET | Refills: 1 | Status: SHIPPED | OUTPATIENT
Start: 2021-12-09 | End: 2022-06-09 | Stop reason: SDUPTHER

## 2021-12-09 NOTE — PATIENT INSTRUCTIONS
Diabetes Mellitus Basics    Diabetes mellitus, or diabetes, is a long-term (chronic) disease. It occurs when the body does not properly use sugar (glucose) that is released from food after you eat.  Diabetes mellitus may be caused by one or both of these problems:  · Your pancreas does not make enough of a hormone called insulin.  · Your body does not react in a normal way to the insulin that it makes.  Insulin lets glucose enter cells in your body. This gives you energy. If you have diabetes, glucose cannot get into cells. This causes high blood glucose (hyperglycemia).  How to treat and manage diabetes  You may need to take insulin or other diabetes medicines daily to keep your glucose in balance. If you are prescribed insulin, you will learn how to give yourself insulin by injection. You may need to adjust the amount of insulin you take based on the foods that you eat.  You will need to check your blood glucose levels using a glucose monitor as told by your health care provider. The readings can help determine if you have low or high blood glucose.  Generally, you should have these blood glucose levels:  · Before meals (preprandial):  mg/dL (4.4-7.2 mmol/L).  · After meals (postprandial): below 180 mg/dL (10 mmol/L).  · Hemoglobin A1c (HbA1c) level: less than 7%.  Your health care provider will set treatment goals for you.  Keep all follow-up visits. This is important.  Follow these instructions at home:  Diabetes medicines  Take your diabetes medicines every day as told by your health care provider. List your diabetes medicines here:  · Name of medicine: ______________________________  ? Amount (dose): _______________ Time (a.m./p.m.): _______________ Notes: ___________________________________  · Name of medicine: ______________________________  ? Amount (dose): _______________ Time (a.m./p.m.): _______________ Notes: ___________________________________  · Name of medicine:  ______________________________  ? Amount (dose): _______________ Time (a.m./p.m.): _______________ Notes: ___________________________________  Insulin  If you use insulin, list the types of insulin you use here:  · Insulin type: ______________________________  ? Amount (dose): _______________ Time (a.m./p.m.): _______________Notes: ___________________________________  · Insulin type: ______________________________  ? Amount (dose): _______________ Time (a.m./p.m.): _______________ Notes: ___________________________________  · Insulin type: ______________________________  ? Amount (dose): _______________ Time (a.m./p.m.): _______________ Notes: ___________________________________  · Insulin type: ______________________________  ? Amount (dose): _______________ Time (a.m./p.m.): _______________ Notes: ___________________________________  · Insulin type: ______________________________  ? Amount (dose): _______________ Time (a.m./p.m.): _______________ Notes: ___________________________________  Managing blood glucose    Check your blood glucose levels using a glucose monitor as told by your health care provider.  Write down the times that you check your glucose levels here:  · Time: _______________ Notes: ___________________________________  · Time: _______________ Notes: ___________________________________  · Time: _______________ Notes: ___________________________________  · Time: _______________ Notes: ___________________________________  · Time: _______________ Notes: ___________________________________  · Time: _______________ Notes: ___________________________________    Low blood glucose  Low blood glucose (hypoglycemia) is when glucose is at or below 70 mg/dL (3.9 mmol/L). Symptoms may include:  · Feeling:  ? Hungry.  ? Sweaty and clammy.  ? Irritable or easily upset.  ? Dizzy.  ? Sleepy.  · Having:  ? A fast heartbeat.  ? A headache.  ? A change in your vision.  ? Numbness around the mouth, lips, or  tongue.  · Having trouble with:  ? Moving (coordination).  ? Sleeping.  Treating low blood glucose  To treat low blood glucose, eat or drink something containing sugar right away. If you can think clearly and swallow safely, follow the 15:15 rule:  · Take 15 grams of a fast-acting carb (carbohydrate), as told by your health care provider.  · Some fast-acting carbs are:  ? Glucose tablets: take 3-4 tablets.  ? Hard candy: eat 3-5 pieces.  ? Fruit juice: drink 4 oz (120 mL).  ? Regular (not diet) soda: drink 4-6 oz (120-180 mL).  ? Honey or sugar: eat 1 Tbsp (15 mL).  · Check your blood glucose levels 15 minutes after you take the carb.  · If your glucose is still at or below 70 mg/dL (3.9 mmol/L), take 15 grams of a carb again.  · If your glucose does not go above 70 mg/dL (3.9 mmol/L) after 3 tries, get help right away.  · After your glucose goes back to normal, eat a meal or a snack within 1 hour.  Treating very low blood glucose  If your glucose is at or below 54 mg/dL (3 mmol/L), you have very low blood glucose (severe hypoglycemia).  This is an emergency. Do not wait to see if the symptoms will go away. Get medical help right away. Call your local emergency services (911 in the [place] Do not drive yourself to the hospital.  Questions to ask your health care provider  · Should I talk with a diabetes educator?  · What equipment will I need to care for myself at home?  · What diabetes medicines do I need? When should I take them?  · How often do I need to check my blood glucose levels?  · What number can I call if I have questions?  · When is my follow-up visit?  · Where can I find a support group for people with diabetes?  Where to find more information  · American Diabetes Association: www.diabetes.org  · Association of Diabetes Care and Education Specialists: www.diabeteseducator.org  Contact a health care provider if:  · Your blood glucose is at or above 240 mg/dL (13.3 mmol/L) for 2 days in a row.  · You have  been sick or have had a fever for 2 days or more, and you are not getting better.  · You have any of these problems for more than 6 hours:  ? You cannot eat or drink.  ? You feel nauseous.  ? You vomit.  ? You have diarrhea.  Get help right away if:  · Your blood glucose is lower than 54 mg/dL (3 mmol/L).  · You get confused.  · You have trouble thinking clearly.  · You have trouble breathing.  These symptoms may represent a serious problem that is an emergency. Do not wait to see if the symptoms will go away. Get medical help right away. Call your local emergency services (911 in the [place] Do not drive yourself to the hospital.  Summary  · Diabetes mellitus is a chronic disease that occurs when the body does not properly use sugar (glucose) that is released from food after you eat.  · Take insulin and diabetes medicines as told.  · Check your blood glucose every day, as often as told.  · Keep all follow-up visits. This is important.  This information is not intended to replace advice given to you by your health care provider. Make sure you discuss any questions you have with your health care provider.  Document Revised: 04/20/2021 Document Reviewed: 04/20/2021  [employer] Patient Education © 2021 [employer]

## 2021-12-09 NOTE — PROGRESS NOTES
Kurt Saucedo presents to [facility].    Chief Complaint:  Diabetes, blood pressure, cholesterol, prostate    Subjective       History of Present Illness:  Raúl is in today for follow-up on his care.  He has a history of type 2 diabetes for which he currently takes Metformin and Jardiance.  He has noted some increased urinary frequency with Jardiance.  He denies any obvious sign of urinary tract infection though.    He also has a history of coronary artery disease, hypertension, and hyperlipidemia.  He remains on medications for these issues.      Review of Systems:  Review of Systems   Constitutional: Negative for chills and fever.   Respiratory: Negative for cough and shortness of breath.    Cardiovascular: Negative for chest pain and palpitations.   Gastrointestinal: Negative for abdominal pain, nausea and vomiting.        Objective   Medical History:  Past Medical History:   • CAD    non-ST-elevation myocardial infarction, 100% occlusion of the RCA   • Essential hypertension   • Hyperlipidemia LDL goal <70   • Type 2 diabetes mellitus (HCC)     Past Surgical History:   • BONY PELVIS SURGERY   • TONSILLECTOMY   • TOTAL HIP ARTHROPLASTY      Family History   Problem Relation Age of Onset   • Heart failure Father    • Atrial fibrillation Father    • COPD Father    • Diabetes type II Father      Social History     Tobacco Use   • Smoking status: Former Smoker     Packs/day: 1.50     Years: 34.00     Pack years: 51.00     Types: Cigarettes     Start date:      Quit date: 2017     Years since quittin.9   • Smokeless tobacco: Current User     Types: Snuff   Substance Use Topics   • Alcohol use: Yes       Health Maintenance Due   Topic Date Due   • ANNUAL PHYSICAL  Never done   • Hepatitis B (1 of 3 - Risk 3-dose series) Never done   • TDAP/TD VACCINES (1 - Tdap) Never done   • ZOSTER VACCINE (1 of 2) Never done   • HEPATITIS C SCREENING  Never done   • DIABETIC EYE EXAM  Never  "done        Immunization History   Administered Date(s) Administered   • COVID-19 (PFIZER) 03/20/2021, 04/10/2021, 11/30/2021   • Flu Vaccine Intradermal Quad 18-64YR 11/01/2021   • Pneumococcal Polysaccharide (PPSV23) 12/09/2021       No Known Allergies     Medications:  Current Outpatient Medications on File Prior to Visit   Medication Sig   • aspirin 81 MG EC tablet Take 81 mg by mouth Daily.   • ezetimibe (ZETIA) 10 MG tablet TAKE ONE TABLET BY MOUTH EVERY 24 HOURS   • metoprolol succinate XL (TOPROL-XL) 25 MG 24 hr tablet 2 (two) times a day.   • ticagrelor (Brilinta) 60 MG tablet tablet Take 1 tablet by mouth 2 (Two) Times a Day.   • [DISCONTINUED] Jardiance 25 MG tablet tablet TAKE ONE TABLET BY MOUTH EVERY MORNING   • [DISCONTINUED] lisinopril-hydrochlorothiazide (PRINZIDE,ZESTORETIC) 20-25 MG per tablet TAKE ONE TABLET BY MOUTH DAILY   • [DISCONTINUED] metFORMIN ER (GLUCOPHAGE-XR) 500 MG 24 hr tablet TAKE TWO TABLETS BY MOUTH DAILY   • [DISCONTINUED] PARoxetine (PAXIL) 20 MG tablet TAKE ONE TABLET BY MOUTH DAILY   • [DISCONTINUED] rosuvastatin (CRESTOR) 20 MG tablet Take 20 mg by mouth Daily.     No current facility-administered medications on file prior to visit.       Vital Signs:   /76 (BP Location: Left arm, Patient Position: Sitting)   Pulse 72   Temp 98.3 °F (36.8 °C) (Oral)   Ht 188 cm (74.02\")   Wt 117 kg (259 lb)   BMI 33.24 kg/m²       Physical Exam:  Physical Exam  Vitals reviewed. Chaperone present: patient declined chaperone.   Constitutional:       General: He is not in acute distress.     Appearance: He is obese. He is not ill-appearing.   Eyes:      Pupils: Pupils are equal, round, and reactive to light.   Neck:      Comments: No thyromegaly  Cardiovascular:      Rate and Rhythm: Normal rate and regular rhythm.      Pulses:           Dorsalis pedis pulses are 2+ on the right side and 2+ on the left side.   Pulmonary:      Effort: Pulmonary effort is normal.      Breath sounds: " Normal breath sounds.   Abdominal:      General: There is no distension.      Palpations: Abdomen is soft.      Tenderness: There is no abdominal tenderness.   Genitourinary:     Prostate: Not enlarged and no nodules present.      Rectum: Guaiac result negative. No mass.   Musculoskeletal:      Cervical back: Neck supple.   Feet:      Right foot:      Protective Sensation: 3 sites tested. 3 sites sensed.      Skin integrity: Skin integrity normal.      Left foot:      Protective Sensation: 3 sites tested. 3 sites sensed.      Skin integrity: Skin integrity normal.   Lymphadenopathy:      Cervical: No cervical adenopathy.   Skin:     Findings: No lesion or rash.   Neurological:      Mental Status: He is alert.         Result Review      The following data was reviewed by Stevan Rubio MD on 12/09/2021.  Lab Results   Component Value Date    WBC 10.80 05/07/2021    HGB 16.90 05/07/2021    HCT 50.7 05/07/2021    MCV 92.5 05/07/2021    .00 05/07/2021     Lab Results   Component Value Date    GLUCOSE 121 (H) 05/07/2021    BUN 13 05/07/2021    CREATININE 1.06 05/07/2021     05/07/2021    K 4.1 05/07/2021    CL 98 (L) 05/07/2021    CO2 28 05/07/2021    CALCIUM 9.6 05/07/2021    PROTEINTOT 8.0 05/07/2021    ALBUMIN 5.2 (H) 05/07/2021    ALT 48 (H) 05/07/2021    AST 38 05/07/2021    ALKPHOS 66 05/07/2021    BILITOT 0.73 05/07/2021    GLOB 2.8 05/07/2021    BCR 12 05/07/2021    ANIONGAP 16 05/07/2021      Lab Results   Component Value Date    CHLPL 131 05/07/2021    TRIG 238 (H) 05/07/2021    HDL 36 (L) 05/07/2021    LDL 47 (L) 05/07/2021     Lab Results   Component Value Date    TSH 1.160 10/16/2020     Lab Results   Component Value Date    HGBA1C 6.9 09/17/2021     Lab Results   Component Value Date    PSA 0.42 10/16/2020    PSA 0.54 06/04/2019            Assessment and Plan:   Today, we have reviewed Kurt's care.  I am a little concerned that he has had some weight gain in the last few months.  We  will go ahead and update labs and see where things stand.  Hopefully, the A1c will be okay.  We will follow from there.       Diagnoses and all orders for this visit:    1. Type 2 diabetes mellitus with other circulatory complication, without long-term current use of insulin (HCC) (Primary)  -     Comprehensive Metabolic Panel; Future  -     Hemoglobin A1c; Future  -     TSH; Future  -     empagliflozin (Jardiance) 25 MG tablet tablet; Take 1 tablet by mouth Every Morning.  Dispense: 90 tablet; Refill: 1  -     metFORMIN ER (GLUCOPHAGE-XR) 500 MG 24 hr tablet; Take 2 tablets by mouth Daily.  Dispense: 180 tablet; Refill: 1    2. CAD  -     CBC (No Diff); Future  -     rosuvastatin (CRESTOR) 20 MG tablet; Take 1 tablet by mouth Daily.  Dispense: 90 tablet; Refill: 1    3. Essential hypertension  -     Comprehensive Metabolic Panel; Future  -     lisinopril-hydrochlorothiazide (PRINZIDE,ZESTORETIC) 20-25 MG per tablet; Take 1 tablet by mouth Daily.  Dispense: 90 tablet; Refill: 1    4. Hyperlipidemia LDL goal <70  -     Comprehensive Metabolic Panel; Future  -     rosuvastatin (CRESTOR) 20 MG tablet; Take 1 tablet by mouth Daily.  Dispense: 90 tablet; Refill: 1    5. Need for hepatitis C screening test  -     Hepatitis C Antibody; Future    6. Screening for prostate cancer  -     PSA Screen; Future    7. Nicotine dependence, cigarettes, uncomplicated  -      CT Chest Low Dose Cancer Screening WO; Future    8. Encounter for immunization  -     Pneumococcal Polysaccharide Vaccine 23-Valent Greater Than or Equal To 3yo Subcutaneous / IM    Other orders  -     PARoxetine (PAXIL) 20 MG tablet; Take 1 tablet by mouth Daily.  Dispense: 90 tablet; Refill: 1          Follow Up   Return in about 6 months (around 6/9/2022).  Patient was given instructions and counseling regarding his condition or for health maintenance advice. Please see specific information pulled into the AVS if appropriate.

## 2022-03-08 RX ORDER — EZETIMIBE 10 MG/1
10 TABLET ORAL DAILY
Qty: 90 TABLET | Refills: 2 | Status: SHIPPED | OUTPATIENT
Start: 2022-03-08 | End: 2022-12-28 | Stop reason: SDUPTHER

## 2022-03-10 ENCOUNTER — CLINICAL SUPPORT (OUTPATIENT)
Dept: FAMILY MEDICINE CLINIC | Age: 57
End: 2022-03-10

## 2022-03-10 ENCOUNTER — TELEPHONE (OUTPATIENT)
Dept: FAMILY MEDICINE CLINIC | Age: 57
End: 2022-03-10

## 2022-03-10 DIAGNOSIS — E11.59 TYPE 2 DIABETES MELLITUS WITH OTHER CIRCULATORY COMPLICATION, WITHOUT LONG-TERM CURRENT USE OF INSULIN: Primary | ICD-10-CM

## 2022-03-10 LAB
EXPIRATION DATE: ABNORMAL
HBA1C MFR BLD: 7.2 %
Lab: ABNORMAL

## 2022-03-10 PROCEDURE — 83036 HEMOGLOBIN GLYCOSYLATED A1C: CPT | Performed by: FAMILY MEDICINE

## 2022-04-28 ENCOUNTER — OFFICE VISIT (OUTPATIENT)
Dept: CARDIOLOGY | Facility: CLINIC | Age: 57
End: 2022-04-28

## 2022-04-28 VITALS
WEIGHT: 246 LBS | HEART RATE: 70 BPM | SYSTOLIC BLOOD PRESSURE: 119 MMHG | HEIGHT: 74 IN | BODY MASS INDEX: 31.57 KG/M2 | OXYGEN SATURATION: 95 % | DIASTOLIC BLOOD PRESSURE: 61 MMHG

## 2022-04-28 DIAGNOSIS — I10 ESSENTIAL HYPERTENSION: ICD-10-CM

## 2022-04-28 DIAGNOSIS — E78.5 HYPERLIPIDEMIA LDL GOAL <70: ICD-10-CM

## 2022-04-28 DIAGNOSIS — I25.10 CORONARY ARTERY DISEASE INVOLVING NATIVE CORONARY ARTERY OF NATIVE HEART WITHOUT ANGINA PECTORIS: Primary | ICD-10-CM

## 2022-04-28 PROCEDURE — 93000 ELECTROCARDIOGRAM COMPLETE: CPT | Performed by: INTERNAL MEDICINE

## 2022-04-28 PROCEDURE — 99214 OFFICE O/P EST MOD 30 MIN: CPT | Performed by: INTERNAL MEDICINE

## 2022-04-28 RX ORDER — METOPROLOL SUCCINATE 25 MG/1
25 TABLET, EXTENDED RELEASE ORAL DAILY
Qty: 180 TABLET | Refills: 2 | Status: SHIPPED | OUTPATIENT
Start: 2022-04-28

## 2022-04-28 RX ORDER — ROSUVASTATIN CALCIUM 20 MG/1
20 TABLET, COATED ORAL DAILY
Qty: 90 TABLET | Refills: 1 | Status: SHIPPED | OUTPATIENT
Start: 2022-04-28 | End: 2022-08-22

## 2022-04-28 NOTE — PROGRESS NOTES
Chief Complaint  Hypertension and Hyperlipidemia    Subjective    Patient not been experiencing any anginal chest pain or shortness of breath issues.    Past Medical History:   Diagnosis Date   • CAD 10/4/2021    non-ST-elevation myocardial infarction, 100% occlusion of the RCA   • Essential hypertension 10/4/2021   • Hyperlipidemia LDL goal <70 10/4/2021   • Type 2 diabetes mellitus (HCC)          Current Outpatient Medications:   •  aspirin 81 MG EC tablet, Take 81 mg by mouth Daily., Disp: , Rfl:   •  empagliflozin (Jardiance) 25 MG tablet tablet, Take 1 tablet by mouth Every Morning., Disp: 90 tablet, Rfl: 1  •  ezetimibe (ZETIA) 10 MG tablet, Take 1 tablet by mouth Daily., Disp: 90 tablet, Rfl: 2  •  lisinopril-hydrochlorothiazide (PRINZIDE,ZESTORETIC) 20-25 MG per tablet, Take 1 tablet by mouth Daily., Disp: 90 tablet, Rfl: 1  •  metFORMIN ER (GLUCOPHAGE-XR) 500 MG 24 hr tablet, Take 2 tablets by mouth Daily., Disp: 180 tablet, Rfl: 1  •  metoprolol succinate XL (TOPROL-XL) 25 MG 24 hr tablet, Take 1 tablet by mouth Daily., Disp: 180 tablet, Rfl: 2  •  PARoxetine (PAXIL) 20 MG tablet, Take 1 tablet by mouth Daily., Disp: 90 tablet, Rfl: 1  •  rosuvastatin (CRESTOR) 20 MG tablet, Take 1 tablet by mouth Daily., Disp: 90 tablet, Rfl: 1  •  ticagrelor (Brilinta) 60 MG tablet tablet, Take 1 tablet by mouth 2 (Two) Times a Day., Disp: 180 tablet, Rfl: 3    Medications Discontinued During This Encounter   Medication Reason   • metoprolol succinate XL (TOPROL-XL) 25 MG 24 hr tablet Reorder   • ticagrelor (Brilinta) 60 MG tablet tablet Reorder   • rosuvastatin (CRESTOR) 20 MG tablet Reorder     No Known Allergies     Social History     Tobacco Use   • Smoking status: Former Smoker     Packs/day: 1.50     Years: 34.00     Pack years: 51.00     Types: Cigarettes     Start date:      Quit date: 2017     Years since quittin.3   • Smokeless tobacco: Current User     Types: Snuff   Vaping Use   • Vaping Use: Never  "used   Substance Use Topics   • Alcohol use: Yes   • Drug use: Never       Family History   Problem Relation Age of Onset   • Heart failure Father    • Atrial fibrillation Father    • COPD Father    • Diabetes type II Father         Objective     /61 (BP Location: Right arm, Patient Position: Sitting)   Pulse 70   Ht 188 cm (74\")   Wt 112 kg (246 lb)   SpO2 95%   BMI 31.58 kg/m²       Physical Exam    General Appearance:   · no acute distress  · Alert and oriented x3  HENT:   · lips not cyanotic  · Atraumatic  Neck:  · No jvd   · supple  Respiratory:  · no respiratory distress  · normal breath sounds  · no rales  Cardiovascular:  · Regular rate and rhythm  · no S3, no S4   · no murmur  · no rub  Extremities  · No cyanosis  · lower extremity edema: none    Skin:   · warm, dry  · No rashes      Result Review :     No results found for: PROBNP  CMP    CMP 5/7/21 12/9/21   Glucose 121 (A) 127 (A)   BUN 13 14   Creatinine 1.06 0.88   eGFR Non African Am  90   Sodium 138 136   Potassium 4.1 4.0   Chloride 98 (A) 99   Calcium 9.6 9.5   Albumin 5.2 (A) 4.90   Total Bilirubin 0.73 0.5   Alkaline Phosphatase 66 70   AST (SGOT) 38 27   ALT (SGPT) 48 (A) 38   (A) Abnormal value            CBC w/diff    CBC w/Diff 5/7/21 12/9/21   WBC 10.80 10.11   RBC 5.48 5.30   Hemoglobin 16.90 16.1   Hematocrit 50.7 47.8   MCV 92.5 90.2   MCH 30.8 30.4   MCHC 33.3 33.7   RDW 12.5 12.4   Platelets 331.00 348            Lab Results   Component Value Date    TSH 1.050 12/09/2021      Lab Results   Component Value Date    FREET4 1.1 02/20/2019      No results found for: DDIMERQUANT  Magnesium   Date Value Ref Range Status   02/18/2019 2.05 1.60 - 2.30 mg/dL Final      No results found for: DIGOXIN   Lab Results   Component Value Date    TROPONINT 0.56 (H) 02/20/2019           Lipid Panel    Lipid Panel 5/7/21   Total Cholesterol 131   Triglycerides 238 (A)   HDL Cholesterol 36 (A)   VLDL Cholesterol 48 (A)   LDL Cholesterol  47 (A) "   (A) Abnormal value       Comments are available for some flowsheets but are not being displayed.           Lab Results   Component Value Date    POCTROP 0.00 02/18/2019            ECG 12 Lead    Date/Time: 4/28/2022 4:55 PM  Performed by: Adrian Treadwell MD  Authorized by: Adrian Treadwell MD   Comparison: compared with previous ECG   Rhythm: sinus rhythm  Comments: Borderline IVCD                   Diagnoses and all orders for this visit:    1. CAD (Primary)  Assessment & Plan:  Patient is doing well no ongoing angina continue with chronic aspirin 81 mg daily and Brilinta 60 twice daily dosing    Orders:  -     rosuvastatin (CRESTOR) 20 MG tablet; Take 1 tablet by mouth Daily.  Dispense: 90 tablet; Refill: 1    2. Essential hypertension  Assessment & Plan:  Blood pressure at goal on Toprol 25 and lisinopril hydrochlorothiazide 20/25 daily      3. Hyperlipidemia LDL goal <70  Assessment & Plan:  Continue patient on Crestor 20 and Zetia 10 once a day dosing tolerating well repeat lipids to make sure at goal    Orders:  -     rosuvastatin (CRESTOR) 20 MG tablet; Take 1 tablet by mouth Daily.  Dispense: 90 tablet; Refill: 1  -     Lipid Panel; Future  -     Hepatic Function Panel; Future    Other orders  -     ticagrelor (Brilinta) 60 MG tablet tablet; Take 1 tablet by mouth 2 (Two) Times a Day.  Dispense: 180 tablet; Refill: 3  -     metoprolol succinate XL (TOPROL-XL) 25 MG 24 hr tablet; Take 1 tablet by mouth Daily.  Dispense: 180 tablet; Refill: 2  -     ECG 12 Lead          Follow Up     Return in about 6 months (around 10/28/2022) for EKG with F/U, Follow with Karli Allan.          Patient was given instructions and counseling regarding his condition or for health maintenance advice. Please see specific information pulled into the AVS if appropriate.

## 2022-04-28 NOTE — ASSESSMENT & PLAN NOTE
Continue patient on Crestor 20 and Zetia 10 once a day dosing tolerating well repeat lipids to make sure at goal

## 2022-04-28 NOTE — ASSESSMENT & PLAN NOTE
Patient is doing well no ongoing angina continue with chronic aspirin 81 mg daily and Brilinta 60 twice daily dosing

## 2022-06-09 ENCOUNTER — OFFICE VISIT (OUTPATIENT)
Dept: FAMILY MEDICINE CLINIC | Age: 57
End: 2022-06-09

## 2022-06-09 VITALS
SYSTOLIC BLOOD PRESSURE: 105 MMHG | HEIGHT: 74 IN | BODY MASS INDEX: 31.26 KG/M2 | HEART RATE: 64 BPM | DIASTOLIC BLOOD PRESSURE: 59 MMHG | WEIGHT: 243.6 LBS | TEMPERATURE: 98.4 F

## 2022-06-09 DIAGNOSIS — E78.5 HYPERLIPIDEMIA LDL GOAL <70: ICD-10-CM

## 2022-06-09 DIAGNOSIS — E11.59 TYPE 2 DIABETES MELLITUS WITH OTHER CIRCULATORY COMPLICATION, WITHOUT LONG-TERM CURRENT USE OF INSULIN: ICD-10-CM

## 2022-06-09 DIAGNOSIS — Z12.11 SCREEN FOR COLON CANCER: ICD-10-CM

## 2022-06-09 DIAGNOSIS — F17.210 NICOTINE DEPENDENCE, CIGARETTES, UNCOMPLICATED: ICD-10-CM

## 2022-06-09 DIAGNOSIS — I10 ESSENTIAL HYPERTENSION: ICD-10-CM

## 2022-06-09 DIAGNOSIS — Z00.00 PHYSICAL EXAM: Primary | ICD-10-CM

## 2022-06-09 PROCEDURE — 99396 PREV VISIT EST AGE 40-64: CPT | Performed by: FAMILY MEDICINE

## 2022-06-09 RX ORDER — LISINOPRIL AND HYDROCHLOROTHIAZIDE 25; 20 MG/1; MG/1
1 TABLET ORAL DAILY
Qty: 90 TABLET | Refills: 1 | Status: SHIPPED | OUTPATIENT
Start: 2022-06-09 | End: 2022-12-08 | Stop reason: SDUPTHER

## 2022-06-09 RX ORDER — METFORMIN HYDROCHLORIDE 500 MG/1
1000 TABLET, EXTENDED RELEASE ORAL DAILY
Qty: 180 TABLET | Refills: 1 | Status: SHIPPED | OUTPATIENT
Start: 2022-06-09 | End: 2022-12-08 | Stop reason: SDUPTHER

## 2022-06-09 RX ORDER — PAROXETINE HYDROCHLORIDE 20 MG/1
20 TABLET, FILM COATED ORAL DAILY
Qty: 90 TABLET | Refills: 1 | Status: SHIPPED | OUTPATIENT
Start: 2022-06-09 | End: 2022-12-08 | Stop reason: SDUPTHER

## 2022-06-09 NOTE — PROGRESS NOTES
Kurt Saucedo presents to [facility].    Chief Complaint:  Physical exam, diabetes, blood pressure, cholesterol    Subjective       History of Present Illness:  Raúl is in today for wellness exam.  He is 57 years old and works for [employer] where he has been for a little over 2 years.  He stopped smoking in the last few years.  Raúl was did smoke for approximately 35 years.  He probably averaged a pack daily during that time.  He has not started cancer screening up to this time.  He is due for repeat colonoscopy after having tubular adenoma roughly 5 years ago.    Raúl also has hypertension, elevated cholesterol, coronary artery disease and type 2 diabetes.  He is due for repeat blood work.      Review of Systems:  Review of Systems   Constitutional: Negative for chills and fever.   Respiratory: Negative for cough and shortness of breath.    Cardiovascular: Negative for chest pain and palpitations.   Gastrointestinal: Negative for abdominal pain, nausea and vomiting.        Objective   Medical History:  Past Medical History:   • CAD    non-ST-elevation myocardial infarction, 100% occlusion of the RCA   • Essential hypertension   • Hyperlipidemia LDL goal <70   • Myocardial infarction (HCC)   • Type 2 diabetes mellitus (HCC)     Past Surgical History:   • BONY PELVIS SURGERY   • COLONOSCOPY    Tubular adenoma   • JOINT REPLACEMENT   • TONSILLECTOMY   • TOTAL HIP ARTHROPLASTY      Family History   Problem Relation Age of Onset   • Heart failure Father    • Atrial fibrillation Father    • COPD Father    • Diabetes type II Father    • Hypertension Father      Social History     Tobacco Use   • Smoking status: Former Smoker     Packs/day: 1.50     Years: 34.00     Pack years: 51.00     Types: Cigarettes     Start date: 1983     Quit date: 2017     Years since quittin.3   • Smokeless tobacco: Current User     Types: Snuff   Substance Use Topics   • Alcohol use: Yes  "      Health Maintenance Due   Topic Date Due   • Hepatitis B (1 of 3 - Risk 3-dose series) Never done   • TDAP/TD VACCINES (1 - Tdap) Never done   • ZOSTER VACCINE (1 of 2) Never done   • LUNG CANCER SCREENING  02/18/2020   • DIABETIC EYE EXAM  Never done   • LIPID PANEL  05/07/2022   • URINE MICROALBUMIN  05/07/2022   • COLORECTAL CANCER SCREENING  05/15/2022        Immunization History   Administered Date(s) Administered   • COVID-19 (PFIZER) PURPLE CAP 03/20/2021, 04/10/2021, 11/30/2021, 12/01/2021   • Flu Vaccine Intradermal Quad 18-64YR 10/19/2021, 11/01/2021   • Pneumococcal Polysaccharide (PPSV23) 12/09/2021       No Known Allergies     Medications:  Current Outpatient Medications on File Prior to Visit   Medication Sig   • aspirin 81 MG EC tablet Take 81 mg by mouth Daily.   • ezetimibe (ZETIA) 10 MG tablet Take 1 tablet by mouth Daily.   • metoprolol succinate XL (TOPROL-XL) 25 MG 24 hr tablet Take 1 tablet by mouth Daily.   • rosuvastatin (CRESTOR) 20 MG tablet Take 1 tablet by mouth Daily.   • ticagrelor (Brilinta) 60 MG tablet tablet Take 1 tablet by mouth 2 (Two) Times a Day.   • [DISCONTINUED] empagliflozin (Jardiance) 25 MG tablet tablet Take 1 tablet by mouth Every Morning.   • [DISCONTINUED] lisinopril-hydrochlorothiazide (PRINZIDE,ZESTORETIC) 20-25 MG per tablet Take 1 tablet by mouth Daily.   • [DISCONTINUED] metFORMIN ER (GLUCOPHAGE-XR) 500 MG 24 hr tablet Take 2 tablets by mouth Daily.   • [DISCONTINUED] PARoxetine (PAXIL) 20 MG tablet Take 1 tablet by mouth Daily.     No current facility-administered medications on file prior to visit.       Vital Signs:   /59 (BP Location: Left arm, Patient Position: Sitting)   Pulse 64   Temp 98.4 °F (36.9 °C) (Oral)   Ht 188 cm (74.02\")   Wt 110 kg (243 lb 9.6 oz)   BMI 31.26 kg/m²       Physical Exam:  Physical Exam  Vitals and nursing note reviewed.   Constitutional:       General: He is not in acute distress.     Appearance: He is not " ill-appearing.   HENT:      Right Ear: Tympanic membrane and ear canal normal.      Left Ear: Tympanic membrane and ear canal normal.      Mouth/Throat:      Mouth: Mucous membranes are moist.      Comments: Pharynx appears normal  Eyes:      Extraocular Movements: Extraocular movements intact.      Pupils: Pupils are equal, round, and reactive to light.   Neck:      Thyroid: No thyromegaly.   Cardiovascular:      Rate and Rhythm: Normal rate and regular rhythm.      Pulses:           Dorsalis pedis pulses are 2+ on the right side and 2+ on the left side.      Heart sounds: No murmur heard.  Pulmonary:      Effort: Pulmonary effort is normal.      Breath sounds: Normal breath sounds.   Abdominal:      General: There is no distension.      Palpations: Abdomen is soft. There is no mass.      Tenderness: There is no abdominal tenderness.   Musculoskeletal:      Cervical back: Normal range of motion.   Feet:      Right foot:      Protective Sensation: 3 sites tested. 3 sites sensed.      Skin integrity: Skin integrity normal.      Left foot:      Protective Sensation: 3 sites tested. 3 sites sensed.      Skin integrity: Skin integrity normal.   Skin:     Findings: No lesion or rash.   Neurological:      General: No focal deficit present.      Mental Status: He is oriented to person, place, and time.      Cranial Nerves: No cranial nerve deficit.   Psychiatric:         Mood and Affect: Mood normal.         Result Review      The following data was reviewed by Stevan Rubio MD on 06/09/2022.  Lab Results   Component Value Date    WBC 10.11 12/09/2021    HGB 16.1 12/09/2021    HCT 47.8 12/09/2021    MCV 90.2 12/09/2021     12/09/2021     Lab Results   Component Value Date    GLUCOSE 127 (H) 12/09/2021    BUN 14 12/09/2021    CREATININE 0.88 12/09/2021     12/09/2021    K 4.0 12/09/2021    CL 99 12/09/2021    CO2 23.8 12/09/2021    CALCIUM 9.5 12/09/2021    PROTEINTOT 7.4 12/09/2021    ALBUMIN 4.90  12/09/2021    ALT 38 12/09/2021    AST 27 12/09/2021    ALKPHOS 70 12/09/2021    BILITOT 0.5 12/09/2021    EGFRIFNONA 90 12/09/2021    GLOB 2.5 12/09/2021    AGRATIO 2.0 12/09/2021    BCR 15.9 12/09/2021    ANIONGAP 13.2 12/09/2021      Lab Results   Component Value Date    CHLPL 131 05/07/2021    TRIG 238 (H) 05/07/2021    HDL 36 (L) 05/07/2021    LDL 47 (L) 05/07/2021     Lab Results   Component Value Date    TSH 1.050 12/09/2021     Lab Results   Component Value Date    HGBA1C 7.2 03/10/2022     Lab Results   Component Value Date    PSA 0.446 12/09/2021    PSA 0.42 10/16/2020    PSA 0.54 06/04/2019            Assessment and Plan:   Today, we have reviewed his care.  Raúl seems to be doing well.  I am very pleased with his weight loss.  His blood pressure is well controlled.  We will update labs and refill needed medications for him.  We will plan to see him back in about 6 months.  No short-term change is anticipated.       Diagnoses and all orders for this visit:    1. Physical exam (Primary)  -     MicroAlbumin, Urine, Random - Urine, Clean Catch; Future  -     Lipid Panel; Future  -     Hemoglobin A1c; Future  -     Comprehensive Metabolic Panel; Future  -     CBC (No Diff); Future    2. Type 2 diabetes mellitus with other circulatory complication, without long-term current use of insulin (HCC)  -     MicroAlbumin, Urine, Random - Urine, Clean Catch; Future  -     Hemoglobin A1c; Future  -     empagliflozin (Jardiance) 25 MG tablet tablet; Take 1 tablet by mouth Every Morning.  Dispense: 90 tablet; Refill: 1  -     metFORMIN ER (GLUCOPHAGE-XR) 500 MG 24 hr tablet; Take 2 tablets by mouth Daily.  Dispense: 180 tablet; Refill: 1    3. Essential hypertension  -     Comprehensive Metabolic Panel; Future  -     lisinopril-hydrochlorothiazide (PRINZIDE,ZESTORETIC) 20-25 MG per tablet; Take 1 tablet by mouth Daily.  Dispense: 90 tablet; Refill: 1    4. Hyperlipidemia LDL goal <70  -     Lipid Panel; Future    5.  Screen for colon cancer  -     Ambulatory Referral to General Surgery    6. Nicotine dependence, cigarettes, uncomplicated  -     CT Chest Low Dose Wo; Future    Other orders  -     PARoxetine (PAXIL) 20 MG tablet; Take 1 tablet by mouth Daily.  Dispense: 90 tablet; Refill: 1          Follow Up   Return in about 6 months (around 12/9/2022) for Recheck.  Patient was given instructions and counseling regarding his condition or for health maintenance advice. Please see specific information pulled into the AVS if appropriate.

## 2022-06-10 ENCOUNTER — LAB (OUTPATIENT)
Dept: LAB | Facility: HOSPITAL | Age: 57
End: 2022-06-10

## 2022-06-10 DIAGNOSIS — E11.59 TYPE 2 DIABETES MELLITUS WITH OTHER CIRCULATORY COMPLICATION, WITHOUT LONG-TERM CURRENT USE OF INSULIN: ICD-10-CM

## 2022-06-10 DIAGNOSIS — Z00.00 PHYSICAL EXAM: ICD-10-CM

## 2022-06-10 DIAGNOSIS — I10 ESSENTIAL HYPERTENSION: ICD-10-CM

## 2022-06-10 DIAGNOSIS — E78.5 HYPERLIPIDEMIA LDL GOAL <70: ICD-10-CM

## 2022-06-10 LAB
ALBUMIN SERPL-MCNC: 4.6 G/DL (ref 3.5–5.2)
ALBUMIN UR-MCNC: 2.9 MG/DL
ALBUMIN/GLOB SERPL: 1.6 G/DL
ALP SERPL-CCNC: 62 U/L (ref 39–117)
ALT SERPL W P-5'-P-CCNC: 32 U/L (ref 1–41)
ANION GAP SERPL CALCULATED.3IONS-SCNC: 11 MMOL/L (ref 5–15)
AST SERPL-CCNC: 26 U/L (ref 1–40)
BILIRUB CONJ SERPL-MCNC: <0.2 MG/DL (ref 0–0.3)
BILIRUB SERPL-MCNC: 0.5 MG/DL (ref 0–1.2)
BUN SERPL-MCNC: 14 MG/DL (ref 6–20)
BUN/CREAT SERPL: 13.2 (ref 7–25)
CALCIUM SPEC-SCNC: 9.6 MG/DL (ref 8.6–10.5)
CHLORIDE SERPL-SCNC: 99 MMOL/L (ref 98–107)
CHOLEST SERPL-MCNC: 108 MG/DL (ref 0–200)
CO2 SERPL-SCNC: 27 MMOL/L (ref 22–29)
CREAT SERPL-MCNC: 1.06 MG/DL (ref 0.76–1.27)
DEPRECATED RDW RBC AUTO: 39 FL (ref 37–54)
EGFRCR SERPLBLD CKD-EPI 2021: 82.9 ML/MIN/1.73
ERYTHROCYTE [DISTWIDTH] IN BLOOD BY AUTOMATED COUNT: 12 % (ref 12.3–15.4)
GLOBULIN UR ELPH-MCNC: 2.9 GM/DL
GLUCOSE SERPL-MCNC: 118 MG/DL (ref 65–99)
HBA1C MFR BLD: 6.9 % (ref 4.8–5.6)
HCT VFR BLD AUTO: 46.6 % (ref 37.5–51)
HDLC SERPL-MCNC: 31 MG/DL (ref 40–60)
HGB BLD-MCNC: 15.6 G/DL (ref 13–17.7)
LDLC SERPL CALC-MCNC: 41 MG/DL (ref 0–100)
LDLC/HDLC SERPL: 1.03 {RATIO}
MCH RBC QN AUTO: 30.2 PG (ref 26.6–33)
MCHC RBC AUTO-ENTMCNC: 33.5 G/DL (ref 31.5–35.7)
MCV RBC AUTO: 90.3 FL (ref 79–97)
PLATELET # BLD AUTO: 337 10*3/MM3 (ref 140–450)
PMV BLD AUTO: 9.6 FL (ref 6–12)
POTASSIUM SERPL-SCNC: 3.9 MMOL/L (ref 3.5–5.2)
PROT SERPL-MCNC: 7.5 G/DL (ref 6–8.5)
RBC # BLD AUTO: 5.16 10*6/MM3 (ref 4.14–5.8)
SODIUM SERPL-SCNC: 137 MMOL/L (ref 136–145)
TRIGL SERPL-MCNC: 226 MG/DL (ref 0–150)
VLDLC SERPL-MCNC: 36 MG/DL (ref 5–40)
WBC NRBC COR # BLD: 8.77 10*3/MM3 (ref 3.4–10.8)

## 2022-06-10 PROCEDURE — 80053 COMPREHEN METABOLIC PANEL: CPT

## 2022-06-10 PROCEDURE — 85027 COMPLETE CBC AUTOMATED: CPT

## 2022-06-10 PROCEDURE — 36415 COLL VENOUS BLD VENIPUNCTURE: CPT

## 2022-06-10 PROCEDURE — 82248 BILIRUBIN DIRECT: CPT

## 2022-06-10 PROCEDURE — 80061 LIPID PANEL: CPT

## 2022-06-10 PROCEDURE — 83036 HEMOGLOBIN GLYCOSYLATED A1C: CPT

## 2022-06-10 PROCEDURE — 82043 UR ALBUMIN QUANTITATIVE: CPT

## 2022-07-26 ENCOUNTER — OFFICE VISIT (OUTPATIENT)
Dept: SURGERY | Facility: CLINIC | Age: 57
End: 2022-07-26

## 2022-07-26 ENCOUNTER — PREP FOR SURGERY (OUTPATIENT)
Dept: OTHER | Facility: HOSPITAL | Age: 57
End: 2022-07-26

## 2022-07-26 VITALS — RESPIRATION RATE: 16 BRPM | WEIGHT: 247 LBS | BODY MASS INDEX: 31.7 KG/M2 | HEIGHT: 74 IN | HEART RATE: 63 BPM

## 2022-07-26 DIAGNOSIS — Z86.010 HISTORY OF COLONIC POLYPS: ICD-10-CM

## 2022-07-26 DIAGNOSIS — Z12.11 SCREENING FOR MALIGNANT NEOPLASM OF COLON: Primary | ICD-10-CM

## 2022-07-26 PROBLEM — Z86.0100 HISTORY OF COLONIC POLYPS: Status: ACTIVE | Noted: 2022-07-26

## 2022-07-26 PROCEDURE — S0285 CNSLT BEFORE SCREEN COLONOSC: HCPCS | Performed by: NURSE PRACTITIONER

## 2022-07-26 RX ORDER — CHLORHEXIDINE GLUCONATE 0.12 MG/ML
RINSE ORAL
Status: ON HOLD | COMMUNITY
Start: 2022-07-20 | End: 2022-11-14

## 2022-07-26 RX ORDER — HYDROCODONE BITARTRATE AND ACETAMINOPHEN 5; 325 MG/1; MG/1
1 TABLET ORAL
COMMUNITY
Start: 2022-07-20 | End: 2022-11-10

## 2022-07-26 RX ORDER — AMOXICILLIN 500 MG/1
CAPSULE ORAL
COMMUNITY
Start: 2022-07-20 | End: 2022-08-29

## 2022-07-26 RX ORDER — POLYETHYLENE GLYCOL 3350 17 G/17G
POWDER, FOR SOLUTION ORAL
Qty: 238 PACKET | Refills: 0 | Status: ON HOLD | OUTPATIENT
Start: 2022-07-26 | End: 2022-11-14

## 2022-07-26 NOTE — PROGRESS NOTES
Chief Complaint: Colonoscopy (consult)    Subjective      Colonoscopy consultation       History of Present Illness  Kurt Saucedo is a 57 y.o. male presents to [facility] for colonoscopy consultation.    Patient presents today without complaints for screening colonoscopy.  He denies any abdominal pain, change in bowel habit, rectal bleeding.    Denies any family history of colorectal cancer.    Patient reports that he takes Brilinta for MI he had in 2019.  He is under the care of Dr. Treadwell.  I will get cardiac clearance prior to the procedure.    Patient does have NATIVIDAD and uses a CPAP at night.    5/17: Colonoscopy ([facility]): Descending - tubular adenoma.    Objective     Past Medical History:   Diagnosis Date   • CAD 10/04/2021    non-ST-elevation myocardial infarction, 100% occlusion of the RCA   • Essential hypertension 10/04/2021   • Hyperlipidemia LDL goal <70 10/04/2021   • Myocardial infarction (HCC) 2/2019   • Type 2 diabetes mellitus (HCC)        Past Surgical History:   Procedure Laterality Date   • BONY PELVIS SURGERY     • COLONOSCOPY  05/05/2017    Tubular adenoma   • JOINT REPLACEMENT  11/19   • TONSILLECTOMY     • TOTAL HIP ARTHROPLASTY Left        Outpatient Medications Marked as Taking for the 7/26/22 encounter (Office Visit) with Shelly Roach, APRDOE   Medication Sig Dispense Refill   • amoxicillin (AMOXIL) 500 MG capsule      • aspirin 81 MG EC tablet Take 81 mg by mouth Daily.     • chlorhexidine (PERIDEX) 0.12 % solution      • empagliflozin (Jardiance) 25 MG tablet tablet Take 1 tablet by mouth Every Morning. 90 tablet 1   • ezetimibe (ZETIA) 10 MG tablet Take 1 tablet by mouth Daily. 90 tablet 2   • lisinopril-hydrochlorothiazide (PRINZIDE,ZESTORETIC) 20-25 MG per tablet Take 1 tablet by mouth Daily. 90 tablet 1   • metFORMIN ER (GLUCOPHAGE-XR) 500 MG 24 hr tablet Take 2 tablets by mouth Daily. 180 tablet 1   • metoprolol succinate XL (TOPROL-XL) 25 MG 24 hr  "tablet Take 1 tablet by mouth Daily. 180 tablet 2   • PARoxetine (PAXIL) 20 MG tablet Take 1 tablet by mouth Daily. 90 tablet 1   • rosuvastatin (CRESTOR) 20 MG tablet Take 1 tablet by mouth Daily. 90 tablet 1   • ticagrelor (Brilinta) 60 MG tablet tablet Take 1 tablet by mouth 2 (Two) Times a Day. 180 tablet 3       No Known Allergies     Family History   Problem Relation Age of Onset   • Heart failure Father    • Atrial fibrillation Father    • COPD Father    • Diabetes type II Father    • Hypertension Father        Social History     Socioeconomic History   • Marital status:    Tobacco Use   • Smoking status: Former Smoker     Packs/day: 1.50     Years: 34.00     Pack years: 51.00     Types: Cigarettes     Start date: 1983     Quit date: 2017     Years since quittin.4   • Smokeless tobacco: Current User     Types: Snuff   Vaping Use   • Vaping Use: Never used   Substance and Sexual Activity   • Alcohol use: Yes   • Drug use: Never   • Sexual activity: Yes     Partners: Female     Birth control/protection: Surgical, None       Review of Systems   Constitutional: Negative for chills and fever.   Gastrointestinal: Negative for abdominal distention, abdominal pain, anal bleeding, blood in stool, constipation, diarrhea and rectal pain.        Vital Signs:   Pulse 63   Resp 16   Ht 188 cm (74\")   Wt 112 kg (247 lb)   BMI 31.71 kg/m²      Physical Exam  Vitals and nursing note reviewed.   Constitutional:       General: He is not in acute distress.     Appearance: He is obese.   HENT:      Head: Normocephalic.   Cardiovascular:      Rate and Rhythm: Normal rate.   Pulmonary:      Effort: Pulmonary effort is normal.      Breath sounds: No stridor. No wheezing.   Abdominal:      Palpations: Abdomen is soft.      Tenderness: There is no guarding.   Musculoskeletal:         General: No deformity. Normal range of motion.   Skin:     General: Skin is warm and dry.      Coloration: Skin is not jaundiced. "   Neurological:      General: No focal deficit present.      Mental Status: He is alert and oriented to person, place, and time.   Psychiatric:         Mood and Affect: Mood normal.         Thought Content: Thought content normal.          Result Review :          []  Laboratory  []  Radiology  [x]  Pathology  []  Microbiology  []  EKG/Telemetry   []  Cardiology/Vascular   [x]  Old records  Today I reviewed Dr. Baltazar's previous colonoscopy and pathology.     Assessment and Plan    Diagnoses and all orders for this visit:    1. Screening for malignant neoplasm of colon (Primary)    2. History of colonic polyps    Other orders  -     polyethylene glycol (MIRALAX) 17 g packet; Take as directed.  Instructions given in office.  Dispense: 238 g bottle  Dispense: 238 packet; Refill: 0    [place] prep    Follow Up   Return for Schedule colonoscopy with Dr. Baltazar on 11/14/20[facility] at [facility].     Hospital arrival time: 0600.    Possible risks/complications, benefits, and alternatives to surgical or invasive procedure have been explained to patient and/or legal guardian.     Patient has been evaluated and can tolerate anesthesia and/or sedation. Risks, benefits, and alternatives to anesthesia and sedation have been explained to patient and/or legal guardian.  Patient verbalizes understanding and is will proceed with above plan.    Patient was given instructions and counseling regarding his condition or for health maintenance advice. Please see specific information pulled into the AVS if appropriate.      EMR dragon/transcription disclaimer: Much of this encounter note is an electronic transcription/translation of spoken language to printed text. Electronic translation of spoken language may permit erroneous, or at times nonsensical words or phrases to be inadvertently transcribed; although I have reviewed the note for such errors, some may still exist.

## 2022-08-22 DIAGNOSIS — I25.10 CORONARY ARTERY DISEASE INVOLVING NATIVE CORONARY ARTERY OF NATIVE HEART WITHOUT ANGINA PECTORIS: ICD-10-CM

## 2022-08-22 DIAGNOSIS — E78.5 HYPERLIPIDEMIA LDL GOAL <70: ICD-10-CM

## 2022-08-22 RX ORDER — ROSUVASTATIN CALCIUM 20 MG/1
TABLET, COATED ORAL
Qty: 90 TABLET | Refills: 0 | Status: SHIPPED | OUTPATIENT
Start: 2022-08-22 | End: 2022-12-08 | Stop reason: SDUPTHER

## 2022-08-29 ENCOUNTER — OFFICE VISIT (OUTPATIENT)
Dept: FAMILY MEDICINE CLINIC | Age: 57
End: 2022-08-29

## 2022-08-29 VITALS
HEART RATE: 73 BPM | HEIGHT: 74 IN | SYSTOLIC BLOOD PRESSURE: 129 MMHG | BODY MASS INDEX: 32.34 KG/M2 | OXYGEN SATURATION: 95 % | DIASTOLIC BLOOD PRESSURE: 74 MMHG | WEIGHT: 252 LBS | TEMPERATURE: 98.9 F

## 2022-08-29 DIAGNOSIS — M54.50 ACUTE BILATERAL LOW BACK PAIN WITHOUT SCIATICA: Primary | ICD-10-CM

## 2022-08-29 DIAGNOSIS — R10.813 RIGHT LOWER QUADRANT ABDOMINAL TENDERNESS WITHOUT REBOUND TENDERNESS: ICD-10-CM

## 2022-08-29 LAB
BILIRUB BLD-MCNC: NEGATIVE MG/DL
CLARITY, POC: CLEAR
COLOR UR: YELLOW
EXPIRATION DATE: ABNORMAL
GLUCOSE UR STRIP-MCNC: ABNORMAL MG/DL
KETONES UR QL: NEGATIVE
LEUKOCYTE EST, POC: NEGATIVE
Lab: ABNORMAL
NITRITE UR-MCNC: NEGATIVE MG/ML
PH UR: 5 [PH] (ref 5–8)
PROT UR STRIP-MCNC: NEGATIVE MG/DL
RBC # UR STRIP: NEGATIVE /UL
SP GR UR: 1.02 (ref 1–1.03)
UROBILINOGEN UR QL: ABNORMAL

## 2022-08-29 PROCEDURE — 81003 URINALYSIS AUTO W/O SCOPE: CPT | Performed by: PHYSICIAN ASSISTANT

## 2022-08-29 PROCEDURE — 99213 OFFICE O/P EST LOW 20 MIN: CPT | Performed by: PHYSICIAN ASSISTANT

## 2022-08-29 RX ORDER — MELOXICAM 7.5 MG/1
7.5 TABLET ORAL DAILY
Qty: 14 TABLET | Refills: 0 | Status: SHIPPED | OUTPATIENT
Start: 2022-08-29 | End: 2022-09-12

## 2022-08-29 NOTE — PROGRESS NOTES
Subjective     CHIEF COMPLAINT    Chief Complaint   Patient presents with   • Flank Pain     Ongoing since yesterday am. Pt c/o a dull pain.             This is a 57-year-old male presenting the clinic complaining of bilateral low back pain since yesterday.  He describes it as a dull, aching pain that is about a 3 or 4/10 in terms of severity.  It does not radiate and he has not had any injuries that he can recall.  It is worse with movement such as going from lying to seated position.  Denies any urinary symptoms such as hematuria or dysuria and he states he has had no prior history of kidney stones.  He has no urinary incontinence, lower extremity weakness or numbness.  No fevers or chills and has otherwise been feeling well.            Review of Systems   Constitutional: Negative for chills and fever.   Genitourinary: Negative for difficulty urinating.   Musculoskeletal: Positive for back pain. Negative for arthralgias, gait problem, joint swelling and myalgias.   Skin: Negative for wound.   Neurological: Negative for weakness and numbness.            Past Medical History:   Diagnosis Date   • CAD 10/04/2021    non-ST-elevation myocardial infarction, 100% occlusion of the RCA   • Essential hypertension 10/04/2021   • Hyperlipidemia LDL goal <70 10/04/2021   • Myocardial infarction (HCC) 2/2019   • Type 2 diabetes mellitus (HCC)             Past Surgical History:   Procedure Laterality Date   • BONY PELVIS SURGERY     • COLONOSCOPY  05/05/2017    Tubular adenoma   • JOINT REPLACEMENT  11/19   • TONSILLECTOMY     • TOTAL HIP ARTHROPLASTY Left             Family History   Problem Relation Age of Onset   • Heart failure Father    • Atrial fibrillation Father    • COPD Father    • Diabetes type II Father    • Hypertension Father             Social History     Socioeconomic History   • Marital status:    Tobacco Use   • Smoking status: Former Smoker     Packs/day: 1.50     Years: 34.00     Pack years: 51.00      "Types: Cigarettes     Start date: 1983     Quit date: 2017     Years since quittin.5   • Smokeless tobacco: Current User     Types: Snuff   Vaping Use   • Vaping Use: Never used   Substance and Sexual Activity   • Alcohol use: Yes   • Drug use: Never   • Sexual activity: Yes     Partners: Female     Birth control/protection: Surgical, None            No Known Allergies         Current Outpatient Medications on File Prior to Visit   Medication Sig Dispense Refill   • aspirin 81 MG EC tablet Take 81 mg by mouth Daily.     • chlorhexidine (PERIDEX) 0.12 % solution      • empagliflozin (Jardiance) 25 MG tablet tablet Take 1 tablet by mouth Every Morning. 90 tablet 1   • ezetimibe (ZETIA) 10 MG tablet Take 1 tablet by mouth Daily. 90 tablet 2   • lisinopril-hydrochlorothiazide (PRINZIDE,ZESTORETIC) 20-25 MG per tablet Take 1 tablet by mouth Daily. 90 tablet 1   • metFORMIN ER (GLUCOPHAGE-XR) 500 MG 24 hr tablet Take 2 tablets by mouth Daily. 180 tablet 1   • metoprolol succinate XL (TOPROL-XL) 25 MG 24 hr tablet Take 1 tablet by mouth Daily. 180 tablet 2   • PARoxetine (PAXIL) 20 MG tablet Take 1 tablet by mouth Daily. 90 tablet 1   • rosuvastatin (CRESTOR) 20 MG tablet TAKE ONE TABLET BY MOUTH DAILY 90 tablet 0   • ticagrelor (Brilinta) 60 MG tablet tablet Take 1 tablet by mouth 2 (Two) Times a Day. 180 tablet 3   • [DISCONTINUED] amoxicillin (AMOXIL) 500 MG capsule      • HYDROcodone-acetaminophen (NORCO) 5-325 MG per tablet      • polyethylene glycol (MIRALAX) 17 g packet Take as directed.  Instructions given in office.  Dispense: 238 g bottle 238 packet 0     No current facility-administered medications on file prior to visit.            /74 (BP Location: Left arm, Patient Position: Sitting, Cuff Size: Adult)   Pulse 73   Temp 98.9 °F (37.2 °C) (Oral)   Ht 188 cm (74.02\")   Wt 114 kg (252 lb)   SpO2 95% Comment: room air  BMI 32.34 kg/m²          Objective     Physical Exam  Vitals and nursing " note reviewed.   Constitutional:       General: He is not in acute distress.     Appearance: Normal appearance.   Cardiovascular:      Rate and Rhythm: Normal rate and regular rhythm.      Heart sounds: Normal heart sounds.   Pulmonary:      Effort: Pulmonary effort is normal. No respiratory distress.      Breath sounds: Normal breath sounds.   Abdominal:      General: Bowel sounds are normal. There is no distension.      Palpations: Abdomen is soft.      Tenderness: There is abdominal tenderness (mild RLQ tenderness). There is no right CVA tenderness, left CVA tenderness, guarding or rebound.   Musculoskeletal:      Thoracic back: Normal. No spasms or tenderness.      Lumbar back: No spasms, tenderness or bony tenderness. Negative right straight leg raise test and negative left straight leg raise test.      Right hip: No tenderness.      Left hip: No tenderness.   Skin:     General: Skin is warm and dry.      Findings: No rash.   Neurological:      Mental Status: He is alert and oriented to person, place, and time.   Psychiatric:         Mood and Affect: Mood normal.              Procedures                    Lab Results (last 24 hours)     Procedure Component Value Units Date/Time    POCT urinalysis dipstick, automated [[id number]]  (Abnormal) Collected: 08/29/22 1514    Specimen: Urine Updated: 08/29/22 1515     Color Yellow     Clarity, UA Clear     Specific Gravity  1.020     pH, Urine 5.0     Leukocytes Negative     Nitrite, UA Negative     Protein, POC Negative mg/dL      Glucose, UA 1000 mg/dL mg/dL      Ketones, UA Negative     Urobilinogen, UA 0.2 E.U./dL     Bilirubin Negative     Blood, UA Negative     Lot Number [id number]     Expiration Date 5/1/23                No Radiology Exams Resulted Within Past 24 Hours                    Diagnoses and all orders for this visit:    1. Acute bilateral low back pain without sciatica (Primary)  Comments:  Suspect muscular in nature given worse with movement.  No red  flag symptoms or signs on exam.  Will treat with Mobic for now.  To ER for red flag symptoms.  Orders:  -     POCT urinalysis dipstick, automated  -     meloxicam (Mobic) 7.5 MG tablet; Take 1 tablet by mouth Daily for 14 days.  Dispense: 14 tablet; Refill: 0    2. Right lower quadrant abdominal tenderness without rebound tenderness  Comments:  Very very mild tenderness to palpation.  Patient has no other signs of appendicitis.  He should watch this and go to the ER if pain is worsening.                           FOR FULL DISCHARGE INSTRUCTIONS/COMMENTS/HANDOUTS please see the AVS

## 2022-09-15 ENCOUNTER — TELEPHONE (OUTPATIENT)
Dept: FAMILY MEDICINE CLINIC | Age: 57
End: 2022-09-15

## 2022-10-14 ENCOUNTER — TELEPHONE (OUTPATIENT)
Dept: SURGERY | Facility: CLINIC | Age: 57
End: 2022-10-14

## 2022-10-14 NOTE — TELEPHONE ENCOUNTER
Patient: Kurt Saucedo  YOB: 1965      This patient is waiting to have a Colonoscopy which will be perform at [facility] on 11/14/22 by Dr. Baltazar.     Our records indicate this patient is currently taking Brilinta . This procedure requires the patient to suspend their Brilinta 5 days prior to surgery.     Please respond to this request noting your recommendations. You may contact our office at [phone] Option 2 with any questions. I appreciate your prompt response in this matter.     Please return this form to our office as soon as possible to [phone]    ____ I approve my patient to stop taking their Brilinta _____ days prior to the scheduled procedure.    ____ I do NOT approve my patient to stop taking their Anticoagulant Therapy medication at this time.    ____ I approve my patient from a cardiac standpoint.    ____ I do NOT approve my patient from a cardiac standpoint at this time.    Approving physician name (please print):     _____________________________________________    Approving physician signature:     ________________________________            Date:________________      Sincerely,    DEBORAH Rob

## 2022-10-14 NOTE — TELEPHONE ENCOUNTER
Procedure: Colonoscopy    Med Directive: Brilinta    PMH:  CAD with stent 2/2019, HTN, hyperlipidemia    Last Seen: 4/28/22

## 2022-10-14 NOTE — TELEPHONE ENCOUNTER
Lmom for Pt to call the office back. Please let Pt know to hold Brilinta 5 days prior to procedure when he calls back.

## 2022-10-17 ENCOUNTER — TELEPHONE (OUTPATIENT)
Dept: SURGERY | Facility: CLINIC | Age: 57
End: 2022-10-17

## 2022-10-17 NOTE — TELEPHONE ENCOUNTER
Hub staff attempted to follow warm transfer process and was unsuccessful     Caller: Kurt Saucedo    Relationship to patient: Self    Best call back number:   Patient is needing: PT RETURING CALL FOR Shelly BAIN

## 2022-10-28 ENCOUNTER — OFFICE VISIT (OUTPATIENT)
Dept: CARDIOLOGY | Facility: CLINIC | Age: 57
End: 2022-10-28

## 2022-10-28 VITALS
BODY MASS INDEX: 32.47 KG/M2 | HEIGHT: 74 IN | WEIGHT: 253 LBS | SYSTOLIC BLOOD PRESSURE: 132 MMHG | HEART RATE: 68 BPM | DIASTOLIC BLOOD PRESSURE: 72 MMHG

## 2022-10-28 DIAGNOSIS — I25.10 CORONARY ARTERY DISEASE INVOLVING NATIVE CORONARY ARTERY OF NATIVE HEART WITHOUT ANGINA PECTORIS: Primary | ICD-10-CM

## 2022-10-28 DIAGNOSIS — I10 ESSENTIAL HYPERTENSION: ICD-10-CM

## 2022-10-28 DIAGNOSIS — E78.5 HYPERLIPIDEMIA LDL GOAL <70: ICD-10-CM

## 2022-10-28 PROCEDURE — 93000 ELECTROCARDIOGRAM COMPLETE: CPT | Performed by: NURSE PRACTITIONER

## 2022-10-28 PROCEDURE — 99214 OFFICE O/P EST MOD 30 MIN: CPT | Performed by: NURSE PRACTITIONER

## 2022-11-08 ENCOUNTER — HOSPITAL ENCOUNTER (OUTPATIENT)
Dept: CT IMAGING | Facility: HOSPITAL | Age: 57
Discharge: HOME | End: 2022-11-08
Admitting: FAMILY MEDICINE

## 2022-11-08 DIAGNOSIS — F17.210 NICOTINE DEPENDENCE, CIGARETTES, UNCOMPLICATED: ICD-10-CM

## 2022-11-08 PROCEDURE — 71271 CT THORAX LUNG CANCER SCR C-: CPT

## 2022-11-14 ENCOUNTER — HOSPITAL ENCOUNTER (OUTPATIENT)
Facility: HOSPITAL | Age: 57
Setting detail: HOSPITAL OUTPATIENT SURGERY
Discharge: HOME | End: 2022-11-14
Attending: SURGERY | Admitting: SURGERY

## 2022-11-14 ENCOUNTER — ANESTHESIA (OUTPATIENT)
Dept: GASTROENTEROLOGY | Facility: HOSPITAL | Age: 57
End: 2022-11-14

## 2022-11-14 ENCOUNTER — ANESTHESIA EVENT (OUTPATIENT)
Dept: GASTROENTEROLOGY | Facility: HOSPITAL | Age: 57
End: 2022-11-14

## 2022-11-14 VITALS
RESPIRATION RATE: 19 BRPM | TEMPERATURE: 98 F | BODY MASS INDEX: 31.31 KG/M2 | HEART RATE: 66 BPM | OXYGEN SATURATION: 99 % | WEIGHT: 243.83 LBS | SYSTOLIC BLOOD PRESSURE: 127 MMHG | DIASTOLIC BLOOD PRESSURE: 62 MMHG

## 2022-11-14 LAB — GLUCOSE BLDC GLUCOMTR-MCNC: 138 MG/DL (ref 70–99)

## 2022-11-14 PROCEDURE — 25010000002 PROPOFOL 10 MG/ML EMULSION: Performed by: NURSE ANESTHETIST, CERTIFIED REGISTERED

## 2022-11-14 PROCEDURE — 82962 GLUCOSE BLOOD TEST: CPT

## 2022-11-14 RX ORDER — ONDANSETRON 4 MG/1
4 TABLET, FILM COATED ORAL ONCE AS NEEDED
Status: DISCONTINUED | OUTPATIENT
Start: 2022-11-14 | End: 2022-11-14 | Stop reason: HOSPADM

## 2022-11-14 RX ORDER — LIDOCAINE HYDROCHLORIDE 20 MG/ML
INJECTION, SOLUTION EPIDURAL; INFILTRATION; INTRACAUDAL; PERINEURAL AS NEEDED
Status: DISCONTINUED | OUTPATIENT
Start: 2022-11-14 | End: 2022-11-14 | Stop reason: SURG

## 2022-11-14 RX ORDER — SODIUM CHLORIDE, SODIUM LACTATE, POTASSIUM CHLORIDE, CALCIUM CHLORIDE 600; 310; 30; 20 MG/100ML; MG/100ML; MG/100ML; MG/100ML
1000 INJECTION, SOLUTION INTRAVENOUS CONTINUOUS
Status: DISCONTINUED | OUTPATIENT
Start: 2022-11-14 | End: 2022-11-14 | Stop reason: HOSPADM

## 2022-11-14 RX ORDER — SODIUM CHLORIDE, SODIUM LACTATE, POTASSIUM CHLORIDE, CALCIUM CHLORIDE 600; 310; 30; 20 MG/100ML; MG/100ML; MG/100ML; MG/100ML
30 INJECTION, SOLUTION INTRAVENOUS CONTINUOUS
Status: DISCONTINUED | OUTPATIENT
Start: 2022-11-14 | End: 2022-11-14 | Stop reason: HOSPADM

## 2022-11-14 RX ORDER — ONDANSETRON 2 MG/ML
4 INJECTION INTRAMUSCULAR; INTRAVENOUS ONCE AS NEEDED
Status: DISCONTINUED | OUTPATIENT
Start: 2022-11-14 | End: 2022-11-14 | Stop reason: HOSPADM

## 2022-11-14 RX ORDER — PROPOFOL 10 MG/ML
VIAL (ML) INTRAVENOUS AS NEEDED
Status: DISCONTINUED | OUTPATIENT
Start: 2022-11-14 | End: 2022-11-14 | Stop reason: SURG

## 2022-11-14 RX ADMIN — PROPOFOL 100 MG: 10 INJECTION, EMULSION INTRAVENOUS at 07:43

## 2022-11-14 RX ADMIN — LIDOCAINE HYDROCHLORIDE 30 MG: 20 INJECTION, SOLUTION EPIDURAL; INFILTRATION; INTRACAUDAL; PERINEURAL at 07:43

## 2022-11-14 RX ADMIN — PROPOFOL 125 MCG/KG/MIN: 10 INJECTION, EMULSION INTRAVENOUS at 07:43

## 2022-11-14 RX ADMIN — SODIUM CHLORIDE, POTASSIUM CHLORIDE, SODIUM LACTATE AND CALCIUM CHLORIDE 1000 ML: 600; 310; 30; 20 INJECTION, SOLUTION INTRAVENOUS at 06:29

## 2022-11-14 NOTE — ANESTHESIA POSTPROCEDURE EVALUATION
Patient: Kurt Saucedo    Procedure Summary     Date: 11/14/22 Room / Location: [facility] ENDOSCOPY 1 / [facility] ENDOSCOPY    Anesthesia Start: 0740 Anesthesia Stop: 0803    Procedure: COLONOSCOPY Diagnosis:       Screening for malignant neoplasm of colon      History of colonic polyps      (Screening for malignant neoplasm of colon [Z12.11])      (History of colonic polyps [Z86.010])    Surgeons: Artie Baltazar MD Provider: Sebastián Brunner MD    Anesthesia Type: general ASA Status: 3          Anesthesia Type: general    Vitals  No vitals data found for the desired time range.          Post Anesthesia Care and Evaluation    Patient location during evaluation: bedside  Patient participation: complete - patient participated  Level of consciousness: awake  Pain management: adequate    Airway patency: patent  Anesthetic complications: No anesthetic complications  PONV Status: none  Cardiovascular status: acceptable and stable  Respiratory status: acceptable  Hydration status: acceptable    Comments: An Anesthesiologist personally participated in the most demanding procedures (including induction and emergence if applicable) in the anesthesia plan, monitored the course of anesthesia administration at frequent intervals and remained physically present and available for immediate diagnosis and treatment of emergencies.

## 2022-11-14 NOTE — ANESTHESIA PREPROCEDURE EVALUATION
Anesthesia Evaluation     Patient summary reviewed and Nursing notes reviewed   no history of anesthetic complications:  NPO Solid Status: > 8 hours  NPO Liquid Status: > 2 hours           Airway   Mallampati: III  TM distance: >3 FB  Neck ROM: full  No difficulty expected  Dental      Pulmonary - negative pulmonary ROS and normal exam    breath sounds clear to auscultation  Cardiovascular - normal exam  Exercise tolerance: good (4-7 METS)    ECG reviewed  Patient on routine beta blocker and Beta blocker given within 24 hours of surgery  Rhythm: regular  Rate: normal    (+) hypertension, past MI , CAD, hyperlipidemia,       Neuro/Psych- negative ROS  GI/Hepatic/Renal/Endo    (+)   diabetes mellitus type 2,     Musculoskeletal (-) negative ROS    Abdominal    Substance History - negative use     OB/GYN negative ob/gyn ROS         Other - negative ROS       ROS/Med Hx Other: PAT Nursing Notes unavailable.                 Anesthesia Plan    ASA 3     general     (Total IV Anesthesia    Patient understands anesthesia not responsible for dental damage.    Last saw cardiologist one month ago)  intravenous induction     Anesthetic plan, risks, benefits, and alternatives have been provided, discussed and informed consent has been obtained with: patient.    Plan discussed with CRNA.        CODE STATUS:

## 2022-11-14 NOTE — H&P
[facility]   HISTORY AND PHYSICAL    Patient Name: Kurt Saucedo  : 1965  MRN: [id number]  Primary Care Physician:  Stevan Rubio MD  Date of admission: 2022    Subjective   Subjective     Chief Complaint: Colon cancer screening    HPI:    Kurt Saucedo is a 57 y.o. male who presents for colon cancer screening.  He has had polyps in the past.    Review of Systems   Respiratory: Negative for shortness of breath.    Cardiovascular: Negative for chest pain.   Gastrointestinal: Negative for abdominal pain and blood in stool.       Personal History     Past Medical History:   Diagnosis Date   • CAD 10/04/2021    non-ST-elevation myocardial infarction, 100% occlusion of the RCA   • Essential hypertension 10/04/2021   • Hyperlipidemia LDL goal <70 10/04/2021   • Myocardial infarction (HCC) 2019   • Type 2 diabetes mellitus (HCC)        Past Surgical History:   Procedure Laterality Date   • BONY PELVIS SURGERY     • COLONOSCOPY  2017    Tubular adenoma   • JOINT REPLACEMENT     • TONSILLECTOMY     • TOTAL HIP ARTHROPLASTY Left        Family History: family history includes Atrial fibrillation in his father; COPD in his father; Diabetes type II in his father; Heart failure in his father; Hypertension in his father. Otherwise pertinent FHx was reviewed and not pertinent to current issue.    Social History:  reports that he quit smoking about 5 years ago. His smoking use included cigarettes. He started smoking about 39 years ago. He has a 51.00 pack-year smoking history. His smokeless tobacco use includes snuff. He reports current alcohol use. He reports that he does not use drugs.    Home Medications:  PARoxetine, aspirin, empagliflozin, ezetimibe, lisinopril-hydrochlorothiazide, metFORMIN ER, metoprolol succinate XL, rosuvastatin, and ticagrelor    Allergies:  No Known Allergies    Objective    Objective     Vitals:   Temp:  [98.3 °F (36.8 °C)] 98.3 °F (36.8 °C)  Heart Rate:  [74]  74  Resp:  [16] 16  BP: (123)/(82) 123/82    Physical Exam  HENT:      Head: Normocephalic.   Cardiovascular:      Rate and Rhythm: Normal rate.   Pulmonary:      Effort: Pulmonary effort is normal.   Abdominal:      Palpations: Abdomen is soft.   Musculoskeletal:         General: Normal range of motion.      Cervical back: Normal range of motion.   Skin:     General: Skin is warm.   Neurological:      General: No focal deficit present.      Mental Status: He is alert.   Psychiatric:         Mood and Affect: Mood normal.         Result Review    Result Review:  I have personally reviewed the results from the time of this admission to 11/14/2022 07:36 EST and agree with these findings:  []  Laboratory  []  Microbiology  []  Radiology  []  EKG/Telemetry   []  Cardiology/Vascular   []  Pathology  []  Old records  []  Other:  Most notable findings include:     Assessment & Plan   Assessment / Plan     Brief Patient Summary:  Kurt Saucedo is a 57 y.o. male who presents for colon cancer screening.  He does have a history of polyps.    Active Hospital Problems:  Active Hospital Problems    Diagnosis    • Screening for malignant neoplasm of colon    • History of colonic polyps        Plan:   We will proceed with a colonoscopy.  Risk benefits and alternatives were explained.    DVT prophylaxis:  No DVT prophylaxis order currently exists.    CODE STATUS:         Admission Status:  I believe this patient meets outpatient status.    Electronically signed by Artie Baltazar MD, 11/14/22, 7:36 AM EST.

## 2022-12-08 ENCOUNTER — OFFICE VISIT (OUTPATIENT)
Dept: FAMILY MEDICINE CLINIC | Age: 57
End: 2022-12-08

## 2022-12-08 VITALS
BODY MASS INDEX: 32.65 KG/M2 | SYSTOLIC BLOOD PRESSURE: 110 MMHG | HEIGHT: 74 IN | WEIGHT: 254.4 LBS | TEMPERATURE: 98.1 F | DIASTOLIC BLOOD PRESSURE: 67 MMHG | HEART RATE: 77 BPM

## 2022-12-08 DIAGNOSIS — I10 ESSENTIAL HYPERTENSION: ICD-10-CM

## 2022-12-08 DIAGNOSIS — Z12.5 SCREENING FOR PROSTATE CANCER: ICD-10-CM

## 2022-12-08 DIAGNOSIS — E11.59 TYPE 2 DIABETES MELLITUS WITH OTHER CIRCULATORY COMPLICATION, WITHOUT LONG-TERM CURRENT USE OF INSULIN: Primary | ICD-10-CM

## 2022-12-08 DIAGNOSIS — E78.5 HYPERLIPIDEMIA LDL GOAL <70: ICD-10-CM

## 2022-12-08 DIAGNOSIS — I25.10 CORONARY ARTERY DISEASE INVOLVING NATIVE CORONARY ARTERY OF NATIVE HEART WITHOUT ANGINA PECTORIS: ICD-10-CM

## 2022-12-08 PROCEDURE — 99214 OFFICE O/P EST MOD 30 MIN: CPT | Performed by: FAMILY MEDICINE

## 2022-12-08 RX ORDER — LISINOPRIL AND HYDROCHLOROTHIAZIDE 25; 20 MG/1; MG/1
1 TABLET ORAL DAILY
Qty: 90 TABLET | Refills: 1 | Status: SHIPPED | OUTPATIENT
Start: 2022-12-08

## 2022-12-08 RX ORDER — ROSUVASTATIN CALCIUM 20 MG/1
20 TABLET, COATED ORAL DAILY
Qty: 90 TABLET | Refills: 1 | Status: SHIPPED | OUTPATIENT
Start: 2022-12-08

## 2022-12-08 RX ORDER — PAROXETINE HYDROCHLORIDE 20 MG/1
20 TABLET, FILM COATED ORAL DAILY
Qty: 90 TABLET | Refills: 1 | Status: SHIPPED | OUTPATIENT
Start: 2022-12-08

## 2022-12-08 RX ORDER — METFORMIN HYDROCHLORIDE 500 MG/1
1000 TABLET, EXTENDED RELEASE ORAL DAILY
Qty: 180 TABLET | Refills: 1 | Status: SHIPPED | OUTPATIENT
Start: 2022-12-08

## 2022-12-08 NOTE — PROGRESS NOTES
Kurt Saucedo presents to [facility].    Chief Complaint:  Diabetes, blood pressure, cholesterol    Subjective       History of Present Illness:  Raúl is in today for follow-up on his care.  He has type 2 diabetes for which he remains on metformin and Jardiance.  We have recommended he monitor his blood sugar regularly.  He does not tend to check it every day though.  He is due for recheck on A1c, and he is concerned that it may be higher.  He has gained some weight since he was seen most recently.    Raúl also has hypertension, coronary artery disease, and elevated cholesterol.  He remains on treatment for these issues.    Review of Systems:  Review of Systems   Constitutional: Negative for fatigue and unexpected weight loss.   Eyes: Negative for blurred vision.   Respiratory: Negative for cough and shortness of breath.    Cardiovascular: Negative for chest pain and palpitations.   Gastrointestinal: Negative for abdominal pain, nausea and vomiting.   Endocrine: Negative for polydipsia, polyphagia and polyuria.   Skin: Negative for pallor.   Neurological: Negative for dizziness, tremors, seizures, speech difficulty, weakness and confusion.   Psychiatric/Behavioral: The patient is not nervous/anxious.         Objective   Medical History:  Past Medical History:   • CAD    non-ST-elevation myocardial infarction, 100% occlusion of the RCA   • Colon polyp    At last colonoscopy   • Essential hypertension   • Hyperlipidemia LDL goal <70   • Myocardial infarction (HCC)   • Type 2 diabetes mellitus (HCC)     Past Surgical History:   • BONY PELVIS SURGERY   • COLONOSCOPY    Tubular adenoma   • COLONOSCOPY    Procedure: COLONOSCOPY;  Surgeon: Artie Baltazar MD;  Location: [facility] ENDOSCOPY;  Service: General;  Laterality: N/A;  HEMORRHOIDS   • JOINT REPLACEMENT   • TONSILLECTOMY   • TOTAL HIP ARTHROPLASTY      Family History   Problem Relation Age of Onset   • Heart failure Father    •  Atrial fibrillation Father    • COPD Father    • Diabetes type II Father    • Hypertension Father    • Malig Hyperthermia Neg Hx      Social History     Tobacco Use   • Smoking status: Former     Packs/day: 1.50     Years: 34.00     Pack years: 51.00     Types: Cigarettes     Start date: 1983     Quit date: 2017     Years since quittin.8   • Smokeless tobacco: Current     Types: Snuff   Substance Use Topics   • Alcohol use: Yes     Comment: occasionally       Health Maintenance Due   Topic Date Due   • Hepatitis B (1 of 3 - 3-dose series) Never done   • TDAP/TD VACCINES (1 - Tdap) Never done   • ZOSTER VACCINE (1 of 2) Never done   • DIABETIC EYE EXAM  Never done        Immunization History   Administered Date(s) Administered   • COVID-19 (PFIZER) BIVALENT BOOSTER 12+YRS 2022   • COVID-19 (PFIZER) PURPLE CAP 2021, 04/10/2021, 2021, 2021   • Flu Vaccine Intradermal Quad 18-64YR 10/19/2021, 2021   • FluLaval/Fluzone >6mos 10/25/2022   • Pneumococcal Polysaccharide (PPSV23) 2021       No Known Allergies     Medications:  Current Outpatient Medications on File Prior to Visit   Medication Sig   • aspirin 81 MG EC tablet Take 81 mg by mouth Daily.   • ezetimibe (ZETIA) 10 MG tablet Take 1 tablet by mouth Daily.   • metoprolol succinate XL (TOPROL-XL) 25 MG 24 hr tablet Take 1 tablet by mouth Daily.   • ticagrelor (Brilinta) 60 MG tablet tablet Take 1 tablet by mouth 2 (Two) Times a Day.   • [DISCONTINUED] empagliflozin (Jardiance) 25 MG tablet tablet Take 1 tablet by mouth Every Morning.   • [DISCONTINUED] lisinopril-hydrochlorothiazide (PRINZIDE,ZESTORETIC) 20-25 MG per tablet Take 1 tablet by mouth Daily.   • [DISCONTINUED] metFORMIN ER (GLUCOPHAGE-XR) 500 MG 24 hr tablet Take 2 tablets by mouth Daily.   • [DISCONTINUED] PARoxetine (PAXIL) 20 MG tablet Take 1 tablet by mouth Daily.   • [DISCONTINUED] rosuvastatin (CRESTOR) 20 MG tablet TAKE ONE TABLET BY MOUTH DAILY     No  "current facility-administered medications on file prior to visit.       Vital Signs:   /67 (BP Location: Right arm, Patient Position: Sitting)   Pulse 77   Temp 98.1 °F (36.7 °C) (Oral)   Ht 188 cm (74.02\")   Wt 115 kg (254 lb 6.4 oz)   BMI 32.65 kg/m²       Physical Exam:  Physical Exam  Vitals reviewed.   Constitutional:       General: He is not in acute distress.     Appearance: He is not ill-appearing.   Eyes:      Pupils: Pupils are equal, round, and reactive to light.   Neck:      Comments: No thyromegaly  Cardiovascular:      Rate and Rhythm: Normal rate and regular rhythm.   Pulmonary:      Effort: Pulmonary effort is normal.      Breath sounds: Normal breath sounds.   Abdominal:      General: There is no distension.      Palpations: Abdomen is soft.      Tenderness: There is no abdominal tenderness.   Musculoskeletal:      Cervical back: Neck supple.   Lymphadenopathy:      Cervical: No cervical adenopathy.   Skin:     Findings: No lesion or rash.   Neurological:      Mental Status: He is alert.         Result Review      The following data was reviewed by Stevan Rubio MD on 12/08/2022.  Lab Results   Component Value Date    WBC 8.77 06/10/2022    HGB 15.6 06/10/2022    HCT 46.6 06/10/2022    MCV 90.3 06/10/2022     06/10/2022     Lab Results   Component Value Date    GLUCOSE 118 (H) 06/10/2022    BUN 14 06/10/2022    CREATININE 1.06 06/10/2022     06/10/2022    K 3.9 06/10/2022    CL 99 06/10/2022    CO2 27.0 06/10/2022    CALCIUM 9.6 06/10/2022    PROTEINTOT 7.5 06/10/2022    ALBUMIN 4.60 06/10/2022    ALT 32 06/10/2022    AST 26 06/10/2022    ALKPHOS 62 06/10/2022    BILITOT 0.5 06/10/2022    EGFRIFNONA 90 12/09/2021    GLOB 2.9 06/10/2022    AGRATIO 1.6 06/10/2022    BCR 13.2 06/10/2022    ANIONGAP 11.0 06/10/2022      Lab Results   Component Value Date    CHOL 108 06/10/2022    CHLPL 131 05/07/2021    TRIG 226 (H) 06/10/2022    HDL 31 (L) 06/10/2022    LDL 41 " 06/10/2022     Lab Results   Component Value Date    TSH 1.050 12/09/2021     Lab Results   Component Value Date    HGBA1C 6.90 (H) 06/10/2022     Lab Results   Component Value Date    PSA 0.446 12/09/2021    PSA 0.42 10/16/2020    PSA 0.54 06/04/2019            Assessment and Plan:   Today, we have reviewed his care.  Kurt seems to be doing well overall.  We will refill needed medications for him and he will return for labs at his convenience.  Otherwise, we will plan to see him back in about 6 months.       Diagnoses and all orders for this visit:    1. Type 2 diabetes mellitus with other circulatory complication, without long-term current use of insulin (HCC) (Primary)  -     empagliflozin (Jardiance) 25 MG tablet tablet; Take 1 tablet by mouth Every Morning.  Dispense: 90 tablet; Refill: 1  -     metFORMIN ER (GLUCOPHAGE-XR) 500 MG 24 hr tablet; Take 2 tablets by mouth Daily.  Dispense: 180 tablet; Refill: 1  -     Comprehensive Metabolic Panel; Future  -     Hemoglobin A1c; Future  -     TSH; Future    2. Essential hypertension  -     lisinopril-hydrochlorothiazide (PRINZIDE,ZESTORETIC) 20-25 MG per tablet; Take 1 tablet by mouth Daily.  Dispense: 90 tablet; Refill: 1  -     Comprehensive Metabolic Panel; Future    3. CAD  -     rosuvastatin (CRESTOR) 20 MG tablet; Take 1 tablet by mouth Daily.  Dispense: 90 tablet; Refill: 1  -     CBC (No Diff); Future    4. Hyperlipidemia LDL goal <70  -     rosuvastatin (CRESTOR) 20 MG tablet; Take 1 tablet by mouth Daily.  Dispense: 90 tablet; Refill: 1    5. Screening for prostate cancer  -     PSA Screen; Future    Other orders  -     PARoxetine (PAXIL) 20 MG tablet; Take 1 tablet by mouth Daily.  Dispense: 90 tablet; Refill: 1          Follow Up   Return in about 6 months (around 6/8/2023).  Patient was given instructions and counseling regarding his condition or for health maintenance advice. Please see specific information pulled into the AVS if appropriate.

## 2022-12-12 ENCOUNTER — LAB (OUTPATIENT)
Dept: LAB | Facility: HOSPITAL | Age: 57
End: 2022-12-12

## 2022-12-12 DIAGNOSIS — Z12.5 SCREENING FOR PROSTATE CANCER: ICD-10-CM

## 2022-12-12 DIAGNOSIS — I25.10 CORONARY ARTERY DISEASE INVOLVING NATIVE CORONARY ARTERY OF NATIVE HEART WITHOUT ANGINA PECTORIS: ICD-10-CM

## 2022-12-12 DIAGNOSIS — I10 ESSENTIAL HYPERTENSION: ICD-10-CM

## 2022-12-12 DIAGNOSIS — E11.59 TYPE 2 DIABETES MELLITUS WITH OTHER CIRCULATORY COMPLICATION, WITHOUT LONG-TERM CURRENT USE OF INSULIN: ICD-10-CM

## 2022-12-12 LAB
DEPRECATED RDW RBC AUTO: 39 FL (ref 37–54)
ERYTHROCYTE [DISTWIDTH] IN BLOOD BY AUTOMATED COUNT: 12 % (ref 12.3–15.4)
HBA1C MFR BLD: 8 % (ref 4.8–5.6)
HCT VFR BLD AUTO: 47.7 % (ref 37.5–51)
HGB BLD-MCNC: 16.2 G/DL (ref 13–17.7)
MCH RBC QN AUTO: 30.7 PG (ref 26.6–33)
MCHC RBC AUTO-ENTMCNC: 34 G/DL (ref 31.5–35.7)
MCV RBC AUTO: 90.5 FL (ref 79–97)
PLATELET # BLD AUTO: 347 10*3/MM3 (ref 140–450)
PMV BLD AUTO: 9.6 FL (ref 6–12)
PSA SERPL-MCNC: 0.51 NG/ML (ref 0–4)
RBC # BLD AUTO: 5.27 10*6/MM3 (ref 4.14–5.8)
TSH SERPL DL<=0.05 MIU/L-ACNC: 0.77 UIU/ML (ref 0.27–4.2)
WBC NRBC COR # BLD: 8.54 10*3/MM3 (ref 3.4–10.8)

## 2022-12-12 PROCEDURE — 85027 COMPLETE CBC AUTOMATED: CPT

## 2022-12-12 PROCEDURE — 84443 ASSAY THYROID STIM HORMONE: CPT

## 2022-12-12 PROCEDURE — 36415 COLL VENOUS BLD VENIPUNCTURE: CPT

## 2022-12-12 PROCEDURE — G0103 PSA SCREENING: HCPCS

## 2022-12-12 PROCEDURE — 80053 COMPREHEN METABOLIC PANEL: CPT

## 2022-12-12 PROCEDURE — 83036 HEMOGLOBIN GLYCOSYLATED A1C: CPT

## 2022-12-13 LAB
ALBUMIN SERPL-MCNC: 5 G/DL (ref 3.5–5.2)
ALBUMIN/GLOB SERPL: 2.3 G/DL
ALP SERPL-CCNC: 65 U/L (ref 39–117)
ALT SERPL W P-5'-P-CCNC: 31 U/L (ref 1–41)
ANION GAP SERPL CALCULATED.3IONS-SCNC: 14.9 MMOL/L (ref 5–15)
AST SERPL-CCNC: 26 U/L (ref 1–40)
BILIRUB SERPL-MCNC: 0.5 MG/DL (ref 0–1.2)
BUN SERPL-MCNC: 13 MG/DL (ref 6–20)
BUN/CREAT SERPL: 12.1 (ref 7–25)
CALCIUM SPEC-SCNC: 9.7 MG/DL (ref 8.6–10.5)
CHLORIDE SERPL-SCNC: 96 MMOL/L (ref 98–107)
CO2 SERPL-SCNC: 28.1 MMOL/L (ref 22–29)
CREAT SERPL-MCNC: 1.07 MG/DL (ref 0.76–1.27)
EGFRCR SERPLBLD CKD-EPI 2021: 82 ML/MIN/1.73
GLOBULIN UR ELPH-MCNC: 2.2 GM/DL
GLUCOSE SERPL-MCNC: 249 MG/DL (ref 65–99)
POTASSIUM SERPL-SCNC: 4 MMOL/L (ref 3.5–5.2)
PROT SERPL-MCNC: 7.2 G/DL (ref 6–8.5)
SODIUM SERPL-SCNC: 139 MMOL/L (ref 136–145)

## 2022-12-28 RX ORDER — EZETIMIBE 10 MG/1
10 TABLET ORAL DAILY
Qty: 90 TABLET | Refills: 2 | Status: SHIPPED | OUTPATIENT
Start: 2022-12-28

## 2023-01-24 ENCOUNTER — TELEPHONE (OUTPATIENT)
Dept: FAMILY MEDICINE CLINIC | Age: 58
End: 2023-01-24
Payer: COMMERCIAL

## 2023-01-24 NOTE — TELEPHONE ENCOUNTER
----- Message from Marifer Cole LPN sent at 12/13/2022  8:42 AM EST -----   TICKLE to call 6 weeks to see how fasting sugars are trending

## 2023-01-28 NOTE — TELEPHONE ENCOUNTER
Good morning, Kurt.  I hope you are well.  It sounds like your fasting blood sugars are running in a good range overall.  For this reason, I am not advising any change in your medication.  Please continue to monitor and keep a log of your sugars.  I think this will keep you more accountable to a healthy diet.  Also, work toward some gradual weight loss.  Expect to hear back from us in mid March for a recheck on the A1c.  We may need to consider some changes at that time depending on the level.  Let me know if you have other concerns.    Stevan Rubio MD  [facility]    PS - [facility] messages are not reviewed on an urgent basis.  It may be several days before we review and/or respond to your message.  If you have an urgent need, please call the office at [phone].

## 2023-03-13 ENCOUNTER — CLINICAL SUPPORT (OUTPATIENT)
Dept: FAMILY MEDICINE CLINIC | Age: 58
End: 2023-03-13
Payer: COMMERCIAL

## 2023-03-13 ENCOUNTER — TELEPHONE (OUTPATIENT)
Dept: FAMILY MEDICINE CLINIC | Age: 58
End: 2023-03-13
Payer: COMMERCIAL

## 2023-03-13 DIAGNOSIS — E11.59 TYPE 2 DIABETES MELLITUS WITH OTHER CIRCULATORY COMPLICATION, WITHOUT LONG-TERM CURRENT USE OF INSULIN: Primary | ICD-10-CM

## 2023-03-13 LAB
EXPIRATION DATE: NORMAL
HBA1C MFR BLD: 8.2 %
Lab: NORMAL

## 2023-03-13 NOTE — TELEPHONE ENCOUNTER
----- Message from Marifer Cole LPN sent at 12/13/2022  8:42 AM EST -----  TICKLE fingerstick A1c 90 days.

## 2023-03-16 ENCOUNTER — TELEPHONE (OUTPATIENT)
Dept: FAMILY MEDICINE CLINIC | Age: 58
End: 2023-03-16
Payer: COMMERCIAL

## 2023-03-16 DIAGNOSIS — E11.59 TYPE 2 DIABETES MELLITUS WITH OTHER CIRCULATORY COMPLICATION, WITHOUT LONG-TERM CURRENT USE OF INSULIN: Primary | ICD-10-CM

## 2023-03-16 RX ORDER — DULAGLUTIDE 0.75 MG/.5ML
0.5 INJECTION, SOLUTION SUBCUTANEOUS WEEKLY
Qty: 6.5 ML | Refills: 3 | Status: SHIPPED | OUTPATIENT
Start: 2023-03-16

## 2023-03-16 NOTE — TELEPHONE ENCOUNTER
----- Message from Stevan Rubio MD sent at 3/14/2023  6:47 AM EDT -----  Please let Raúl know that his A1c is 8.2% which is up from 8.0% on the most recent check.  This means that his average blood sugar over the last 90 days has been around 185.  This is above goal and could be placing him at increased risk for complications from type 2 diabetes.  I would recommend he continue his current medications for it.  However, I would like to add medication like Trulicity if he is agreeable.  This type of medication can help better control the blood sugar and can aid with weight loss.  Please confirm that he has no family history of multiple endocrine neoplasia or medullary thyroid carcinoma.  Also, please confirm that he has no personal history of pancreatitis.  Assuming these are true, please queue Trulicity 0.75 mg weekly for 90 days with 3 refills.  Please give usual GLP agonist precautions.  We will review further at his follow-up visit in early June.  Let me know if he has other concerns.  Thanks.

## 2023-05-15 ENCOUNTER — OFFICE VISIT (OUTPATIENT)
Dept: CARDIOLOGY | Facility: CLINIC | Age: 58
End: 2023-05-15
Payer: COMMERCIAL

## 2023-05-15 VITALS
DIASTOLIC BLOOD PRESSURE: 75 MMHG | HEIGHT: 74 IN | HEART RATE: 69 BPM | WEIGHT: 247 LBS | SYSTOLIC BLOOD PRESSURE: 133 MMHG | BODY MASS INDEX: 31.7 KG/M2

## 2023-05-15 DIAGNOSIS — I10 ESSENTIAL HYPERTENSION: ICD-10-CM

## 2023-05-15 DIAGNOSIS — E78.5 HYPERLIPIDEMIA LDL GOAL <70: ICD-10-CM

## 2023-05-15 DIAGNOSIS — I25.10 CORONARY ARTERY DISEASE INVOLVING NATIVE CORONARY ARTERY OF NATIVE HEART WITHOUT ANGINA PECTORIS: Primary | ICD-10-CM

## 2023-05-15 PROCEDURE — 99214 OFFICE O/P EST MOD 30 MIN: CPT | Performed by: INTERNAL MEDICINE

## 2023-05-15 RX ORDER — EZETIMIBE 10 MG/1
10 TABLET ORAL DAILY
Qty: 90 TABLET | Refills: 2 | Status: SHIPPED
Start: 2023-05-15

## 2023-05-15 RX ORDER — ASPIRIN 81 MG/1
81 TABLET ORAL DAILY
Status: SHIPPED
Start: 2023-05-15

## 2023-05-15 NOTE — ASSESSMENT & PLAN NOTE
Patient is doing well no ongoing angina continue with chronic aspirin 81 mg daily and Brilinta 60 twice daily

## 2023-05-15 NOTE — PROGRESS NOTES
Chief Complaint  Follow-up, Coronary Artery Disease, and Hypertension    Subjective    Patient is doing well does not report any chest discomfort or changes breathing [place].  He has not been experiencing any myalgias symptoms  Past Medical History:   Diagnosis Date   • CAD 10/04/2021    non-ST-elevation myocardial infarction, 100% occlusion of the RCA   • Colon polyp     At last colonoscopy   • Essential hypertension 10/04/2021   • Hyperlipidemia LDL goal <70 10/04/2021   • Myocardial infarction 2/2019   • Type 2 diabetes mellitus          Current Outpatient Medications:   •  aspirin 81 MG EC tablet, Take 1 tablet by mouth Daily., Disp: , Rfl:   •  Dulaglutide (Trulicity) 0.75 MG/0.5ML solution pen-injector, Inject 0.75 mg under the skin into the appropriate area as directed 1 (One) Time Per Week., Disp: 6.5 mL, Rfl: 3  •  empagliflozin (Jardiance) 25 MG tablet tablet, Take 1 tablet by mouth Every Morning., Disp: 90 tablet, Rfl: 1  •  ezetimibe (ZETIA) 10 MG tablet, Take 1 tablet by mouth Daily., Disp: 90 tablet, Rfl: 2  •  lisinopril-hydrochlorothiazide (PRINZIDE,ZESTORETIC) 20-25 MG per tablet, Take 1 tablet by mouth Daily., Disp: 90 tablet, Rfl: 1  •  metFORMIN ER (GLUCOPHAGE-XR) 500 MG 24 hr tablet, Take 2 tablets by mouth Daily., Disp: 180 tablet, Rfl: 1  •  metoprolol succinate XL (TOPROL-XL) 25 MG 24 hr tablet, Take 1 tablet by mouth Daily., Disp: 180 tablet, Rfl: 2  •  PARoxetine (PAXIL) 20 MG tablet, Take 1 tablet by mouth Daily., Disp: 90 tablet, Rfl: 1  •  rosuvastatin (CRESTOR) 20 MG tablet, Take 1 tablet by mouth Daily., Disp: 90 tablet, Rfl: 1  •  ticagrelor (Brilinta) 60 MG tablet tablet, Take 1 tablet by mouth 2 (Two) Times a Day., Disp: 180 tablet, Rfl: 3    Medications Discontinued During This Encounter   Medication Reason   • aspirin 81 MG EC tablet    • ezetimibe (ZETIA) 10 MG tablet    • ticagrelor (Brilinta) 60 MG tablet tablet      No Known Allergies     Social History     Tobacco Use   •  "Smoking status: Former     Packs/day: 1.50     Years: 34.00     Pack years: 51.00     Types: Cigarettes     Start date: 1983     Quit date: 2017     Years since quittin.2   • Smokeless tobacco: Current     Types: Snuff   Vaping Use   • Vaping Use: Never used   Substance Use Topics   • Alcohol use: Yes     Comment: occasionally   • Drug use: Never       Family History   Problem Relation Age of Onset   • Heart failure Father    • Atrial fibrillation Father    • COPD Father    • Diabetes type II Father    • Hypertension Father    • Malig Hyperthermia Neg Hx         Objective     /75   Pulse 69   Ht 188 cm (74.02\")   Wt 112 kg (247 lb)   BMI 31.70 kg/m²       Physical Exam    General Appearance:   · no acute distress  · Alert and oriented x3  HENT:   · lips not cyanotic  · Atraumatic  Neck:  · No jvd   · supple  Respiratory:  · no respiratory distress  · normal breath sounds  · no rales  Cardiovascular:  · Regular rate and rhythm  · no S3, no S4   · no murmur  · no rub  Extremities  · No cyanosis  · lower extremity edema: none    Skin:   · warm, dry  · No rashes      Result Review :     No results found for: PROBNP  CMP        6/10/2022    07:30 2022    14:14   CMP   Glucose 118   249     BUN 14   13     Creatinine 1.06   1.07     EGFR 82.9   82.0     Sodium 137   139     Potassium 3.9   4.0     Chloride 99   96     Calcium 9.6   9.7     Total Protein 7.5   7.2     Albumin 4.60   5.00     Globulin 2.9   2.2     Total Bilirubin 0.5   0.5     Alkaline Phosphatase 62   65     AST (SGOT) 26   26     ALT (SGPT) 32   31     Albumin/Globulin Ratio 1.6   2.3     BUN/Creatinine Ratio 13.2   12.1     Anion Gap 11.0   14.9       CBC w/diff        6/10/2022    07:30 2022    14:14   CBC w/Diff   WBC 8.77   8.54     RBC 5.16   5.27     Hemoglobin 15.6   16.2     Hematocrit 46.6   47.7     MCV 90.3   90.5     MCH 30.2   30.7     MCHC 33.5   34.0     RDW 12.0   12.0     Platelets 337   347        Lab " Results   Component Value Date    TSH 0.765 12/12/2022      Lab Results   Component Value Date    FREET4 1.1 02/20/2019      No results found for: DDIMERQUANT  Magnesium   Date Value Ref Range Status   02/18/2019 2.05 1.60 - 2.30 mg/dL Final      No results found for: DIGOXIN   Lab Results   Component Value Date    TROPONINT 0.56 (H) 02/20/2019           Lipid Panel        6/10/2022    07:30   Lipid Panel   Total Cholesterol 108     Triglycerides 226     HDL Cholesterol 31     VLDL Cholesterol 36     LDL Cholesterol  41     LDL/HDL Ratio 1.03       Lab Results   Component Value Date    POCTROP 0.00 02/18/2019                      Diagnoses and all orders for this visit:    1. CAD (Primary)  Assessment & Plan:  Patient is doing well no ongoing angina continue with chronic aspirin 81 mg daily and Brilinta 60 twice daily      Orders:  -     aspirin 81 MG EC tablet; Take 1 tablet by mouth Daily.  -     ticagrelor (Brilinta) 60 MG tablet tablet; Take 1 tablet by mouth 2 (Two) Times a Day.  Dispense: 180 tablet; Refill: 3    2. Essential hypertension    3. Hyperlipidemia LDL goal <70  Assessment & Plan:  Continue with Crestor 20 nightly and Zetia 10 mg daily lipids and LFTs at goal    Orders:  -     ezetimibe (ZETIA) 10 MG tablet; Take 1 tablet by mouth Daily.  Dispense: 90 tablet; Refill: 2          Follow Up     Return in about 6 months (around 11/15/2023) for Follow with Karli Allan, EKG with F/U.          Patient was given instructions and counseling regarding his condition or for health maintenance advice. Please see specific information pulled into the AVS if appropriate.

## 2023-05-18 RX ORDER — METOPROLOL SUCCINATE 25 MG/1
TABLET, EXTENDED RELEASE ORAL
Qty: 90 TABLET | Refills: 1 | Status: SHIPPED | OUTPATIENT
Start: 2023-05-18

## 2023-05-26 ENCOUNTER — LAB (OUTPATIENT)
Dept: LAB | Facility: HOSPITAL | Age: 58
End: 2023-05-26
Payer: COMMERCIAL

## 2023-05-26 ENCOUNTER — OFFICE VISIT (OUTPATIENT)
Dept: FAMILY MEDICINE CLINIC | Age: 58
End: 2023-05-26
Payer: COMMERCIAL

## 2023-05-26 VITALS
SYSTOLIC BLOOD PRESSURE: 120 MMHG | HEART RATE: 70 BPM | TEMPERATURE: 97.3 F | HEIGHT: 74 IN | BODY MASS INDEX: 31.54 KG/M2 | DIASTOLIC BLOOD PRESSURE: 80 MMHG | WEIGHT: 245.8 LBS

## 2023-05-26 DIAGNOSIS — I25.10 CORONARY ARTERY DISEASE INVOLVING NATIVE CORONARY ARTERY OF NATIVE HEART WITHOUT ANGINA PECTORIS: ICD-10-CM

## 2023-05-26 DIAGNOSIS — Z23 ENCOUNTER FOR IMMUNIZATION: ICD-10-CM

## 2023-05-26 DIAGNOSIS — I10 ESSENTIAL HYPERTENSION: ICD-10-CM

## 2023-05-26 DIAGNOSIS — Z00.00 PHYSICAL EXAM: Primary | ICD-10-CM

## 2023-05-26 DIAGNOSIS — E11.59 TYPE 2 DIABETES MELLITUS WITH OTHER CIRCULATORY COMPLICATION, WITHOUT LONG-TERM CURRENT USE OF INSULIN: ICD-10-CM

## 2023-05-26 DIAGNOSIS — K42.9 UMBILICAL HERNIA WITHOUT OBSTRUCTION AND WITHOUT GANGRENE: ICD-10-CM

## 2023-05-26 DIAGNOSIS — E78.5 HYPERLIPIDEMIA LDL GOAL <70: ICD-10-CM

## 2023-05-26 DIAGNOSIS — Z00.00 PHYSICAL EXAM: ICD-10-CM

## 2023-05-26 LAB
ALBUMIN SERPL-MCNC: 5.3 G/DL (ref 3.5–5.2)
ALBUMIN UR-MCNC: 2 MG/DL
ALBUMIN/GLOB SERPL: 1.9 G/DL
ALP SERPL-CCNC: 61 U/L (ref 39–117)
ALT SERPL W P-5'-P-CCNC: 41 U/L (ref 1–41)
ANION GAP SERPL CALCULATED.3IONS-SCNC: 14.3 MMOL/L (ref 5–15)
AST SERPL-CCNC: 36 U/L (ref 1–40)
BILIRUB SERPL-MCNC: 0.8 MG/DL (ref 0–1.2)
BUN SERPL-MCNC: 14 MG/DL (ref 6–20)
BUN/CREAT SERPL: 14.7 (ref 7–25)
CALCIUM SPEC-SCNC: 10.6 MG/DL (ref 8.6–10.5)
CHLORIDE SERPL-SCNC: 96 MMOL/L (ref 98–107)
CHOLEST SERPL-MCNC: 103 MG/DL (ref 0–200)
CO2 SERPL-SCNC: 25.7 MMOL/L (ref 22–29)
CREAT SERPL-MCNC: 0.95 MG/DL (ref 0.76–1.27)
EGFRCR SERPLBLD CKD-EPI 2021: 93.9 ML/MIN/1.73
GLOBULIN UR ELPH-MCNC: 2.8 GM/DL
GLUCOSE SERPL-MCNC: 104 MG/DL (ref 65–99)
HBA1C MFR BLD: 7.2 % (ref 4.8–5.6)
HDLC SERPL-MCNC: 32 MG/DL (ref 40–60)
LDLC SERPL CALC-MCNC: 50 MG/DL (ref 0–100)
LDLC/HDLC SERPL: 1.5 {RATIO}
POTASSIUM SERPL-SCNC: 4.1 MMOL/L (ref 3.5–5.2)
PROT SERPL-MCNC: 8.1 G/DL (ref 6–8.5)
SODIUM SERPL-SCNC: 136 MMOL/L (ref 136–145)
TRIGL SERPL-MCNC: 115 MG/DL (ref 0–150)
VLDLC SERPL-MCNC: 21 MG/DL (ref 5–40)

## 2023-05-26 PROCEDURE — 80053 COMPREHEN METABOLIC PANEL: CPT

## 2023-05-26 PROCEDURE — 82043 UR ALBUMIN QUANTITATIVE: CPT

## 2023-05-26 PROCEDURE — 80061 LIPID PANEL: CPT

## 2023-05-26 PROCEDURE — 36415 COLL VENOUS BLD VENIPUNCTURE: CPT

## 2023-05-26 PROCEDURE — 83036 HEMOGLOBIN GLYCOSYLATED A1C: CPT

## 2023-05-26 RX ORDER — METFORMIN HYDROCHLORIDE 500 MG/1
1000 TABLET, EXTENDED RELEASE ORAL DAILY
Qty: 180 TABLET | Refills: 1 | Status: SHIPPED | OUTPATIENT
Start: 2023-05-26

## 2023-05-26 RX ORDER — PAROXETINE HYDROCHLORIDE 20 MG/1
20 TABLET, FILM COATED ORAL DAILY
Qty: 90 TABLET | Refills: 1 | Status: SHIPPED | OUTPATIENT
Start: 2023-05-26

## 2023-05-26 RX ORDER — LISINOPRIL AND HYDROCHLOROTHIAZIDE 25; 20 MG/1; MG/1
1 TABLET ORAL DAILY
Qty: 90 TABLET | Refills: 3 | Status: SHIPPED | OUTPATIENT
Start: 2023-05-26

## 2023-05-26 RX ORDER — ROSUVASTATIN CALCIUM 20 MG/1
20 TABLET, COATED ORAL DAILY
Qty: 90 TABLET | Refills: 3 | Status: SHIPPED | OUTPATIENT
Start: 2023-05-26

## 2023-05-26 NOTE — PROGRESS NOTES
Kurt Saucedo presents to [facility].    Chief Complaint:  Annual physical, diabetes, blood pressure, cholesterol, umbilical hernia    Subjective       History of Present Illness:  Kurt is in today for wellness exam.  He is 58 years old and works for [employer] where he has been for a little over 3 years.  He stopped smoking in the last few years.  Raúl was did smoke for approximately 35 years.  He probably averaged a pack daily during that time.  He did low dose CT chest in November of last year.   He also had colonoscopy in November of last year.      He also has an umbilical hernia that has been present for at least a few weeks.  He is having some intermittent discomfort with this.  This is a new issue for him.    Kurt also has hypertension, elevated cholesterol, coronary artery disease and type 2 diabetes.  He is due for repeat blood work.    Review of Systems:  Review of Systems   Constitutional: Negative for chills and fever.   Respiratory: Negative for cough and shortness of breath.    Cardiovascular: Negative for chest pain and palpitations.   Gastrointestinal: Negative for abdominal pain, nausea and vomiting.      Objective   Medical History:  Past Medical History:   • CAD    non-ST-elevation myocardial infarction, 100% occlusion of the RCA   • Colon polyp    At last colonoscopy   • Essential hypertension   • Hyperlipidemia LDL goal <70   • Myocardial infarction   • Type 2 diabetes mellitus     Past Surgical History:   • BONY PELVIS SURGERY   • COLONOSCOPY    Tubular adenoma   • COLONOSCOPY    Procedure: COLONOSCOPY;  Surgeon: Artie Baltazar MD;  Location: [facility] ENDOSCOPY;  Service: General;  Laterality: N/A;  HEMORRHOIDS   • JOINT REPLACEMENT   • TONSILLECTOMY   • TOTAL HIP ARTHROPLASTY      Family History   Problem Relation Age of Onset   • Heart failure Father    • Atrial fibrillation Father    • COPD Father    • Diabetes type II Father    • Hypertension Father     • Heart disease Father    • Malig Hyperthermia Neg Hx      Social History     Tobacco Use   • Smoking status: Former     Packs/day: 1.50     Years: 34.00     Pack years: 51.00     Types: Cigarettes     Start date: 1983     Quit date: 2017     Years since quittin.3   • Smokeless tobacco: Current     Types: Snuff   Substance Use Topics   • Alcohol use: Yes     Comment: occasionally       Health Maintenance Due   Topic Date Due   • Hepatitis B (1 of 3 - 3-dose series) Never done   • TDAP/TD VACCINES (1 - Tdap) Never done   • ZOSTER VACCINE (1 of 2) Never done   • DIABETIC EYE EXAM  Never done   • Pneumococcal Vaccine 0-64 (2 - PCV) 2022   • DIABETIC FOOT EXAM  2022        Immunization History   Administered Date(s) Administered   • COVID-19 (PFIZER) BIVALENT 12+YRS 2022   • COVID-19 (PFIZER) Purple Cap Monovalent 2021, 04/10/2021, 2021, 2021   • Flu Vaccine Intradermal Quad 18-64YR 10/19/2021, 2021   • FluLaval/Fluzone >6mos 10/25/2022   • Influenza Injectable Mdck Pf Quad 10/19/2021, 10/25/2022   • Pneumococcal Polysaccharide (PPSV23) 2021       No Known Allergies     Medications:  Current Outpatient Medications on File Prior to Visit   Medication Sig   • aspirin 81 MG EC tablet Take 1 tablet by mouth Daily.   • ezetimibe (ZETIA) 10 MG tablet Take 1 tablet by mouth Daily.   • metoprolol succinate XL (TOPROL-XL) 25 MG 24 hr tablet TAKE ONE TABLET BY MOUTH DAILY   • ticagrelor (Brilinta) 60 MG tablet tablet Take 1 tablet by mouth 2 (Two) Times a Day.   • [DISCONTINUED] Dulaglutide (Trulicity) 0.75 MG/0.5ML solution pen-injector Inject 0.75 mg under the skin into the appropriate area as directed 1 (One) Time Per Week.   • [DISCONTINUED] empagliflozin (Jardiance) 25 MG tablet tablet Take 1 tablet by mouth Every Morning.   • [DISCONTINUED] lisinopril-hydrochlorothiazide (PRINZIDE,ZESTORETIC) 20-25 MG per tablet Take 1 tablet by mouth Daily.   • [DISCONTINUED]  "metFORMIN ER (GLUCOPHAGE-XR) 500 MG 24 hr tablet Take 2 tablets by mouth Daily.   • [DISCONTINUED] PARoxetine (PAXIL) 20 MG tablet Take 1 tablet by mouth Daily.   • [DISCONTINUED] rosuvastatin (CRESTOR) 20 MG tablet Take 1 tablet by mouth Daily.     No current facility-administered medications on file prior to visit.       Vital Signs:   /80 (BP Location: Right arm, Patient Position: Sitting)   Pulse 70   Temp 97.3 °F (36.3 °C) (Oral)   Ht 188 cm (74.02\")   Wt 111 kg (245 lb 12.8 oz)   BMI 31.55 kg/m²       Physical Exam:  Physical Exam  Vitals and nursing note reviewed.   Constitutional:       General: He is not in acute distress.     Appearance: He is not ill-appearing.   HENT:      Right Ear: Tympanic membrane and ear canal normal.      Left Ear: Tympanic membrane and ear canal normal.      Mouth/Throat:      Mouth: Mucous membranes are moist.      Comments: Pharynx appears normal  Eyes:      Extraocular Movements: Extraocular movements intact.      Pupils: Pupils are equal, round, and reactive to light.   Neck:      Thyroid: No thyromegaly.   Cardiovascular:      Rate and Rhythm: Normal rate and regular rhythm.      Heart sounds: No murmur heard.  Pulmonary:      Effort: Pulmonary effort is normal.      Breath sounds: Normal breath sounds.   Abdominal:      General: There is no distension.      Palpations: Abdomen is soft. There is no mass.      Tenderness: There is no abdominal tenderness.      Hernia: A hernia (umbilical) is present.   Musculoskeletal:      Cervical back: Normal range of motion.   Skin:     Findings: No lesion or rash.   Neurological:      General: No focal deficit present.      Mental Status: He is oriented to person, place, and time.      Cranial Nerves: No cranial nerve deficit.   Psychiatric:         Mood and Affect: Mood normal.         Result Review      The following data was reviewed by Stevan Rubio MD on 05/26/2023.  Lab Results   Component Value Date    WBC 8.54 " 12/12/2022    HGB 16.2 12/12/2022    HCT 47.7 12/12/2022    MCV 90.5 12/12/2022     12/12/2022     Lab Results   Component Value Date    GLUCOSE 249 (H) 12/12/2022    BUN 13 12/12/2022    CREATININE 1.07 12/12/2022     12/12/2022    K 4.0 12/12/2022    CL 96 (L) 12/12/2022    CO2 28.1 12/12/2022    CALCIUM 9.7 12/12/2022    PROTEINTOT 7.2 12/12/2022    ALBUMIN 5.00 12/12/2022    ALT 31 12/12/2022    AST 26 12/12/2022    ALKPHOS 65 12/12/2022    BILITOT 0.5 12/12/2022    EGFR 82.0 12/12/2022    GLOB 2.2 12/12/2022    AGRATIO 2.3 12/12/2022    BCR 12.1 12/12/2022    ANIONGAP 14.9 12/12/2022      Lab Results   Component Value Date    CHOL 108 06/10/2022    CHLPL 131 05/07/2021    TRIG 226 (H) 06/10/2022    HDL 31 (L) 06/10/2022    LDL 41 06/10/2022     Lab Results   Component Value Date    TSH 0.765 12/12/2022     Lab Results   Component Value Date    HGBA1C 8.2 03/13/2023     Lab Results   Component Value Date    PSA 0.512 12/12/2022    PSA 0.446 12/09/2021    PSA 0.42 10/16/2020            Assessment and Plan:   Today, we have reviewed his care.  Overall, Kurt seems to be doing well.  There are no specific new cancer screenings I would recommend.  We will refill needed medications and update labs as noted below.  He is tolerating Trulicity fairly well, and the dose will be increased to 1.5 mg weekly.  If he has difficulty with that in someway, I asked him to reach back out to us.  Otherwise, we will plan to see him back in about 6 months.  We will follow-up from there.  At the end of the visit, he mentioned that his ankle brace probably needs to be replaced.  He has had recurrent ankle sprains previously and some degree of chronic pain with the ankle.  He is going to stop by the facility that made it for him and see what we need to do for him.       Diagnoses and all orders for this visit:    1. Physical exam (Primary)  -     MicroAlbumin, Urine, Random - Urine, Clean Catch; Future  -     Lipid Panel;  Future  -     Hemoglobin A1c; Future  -     Comprehensive Metabolic Panel; Future    2. Type 2 diabetes mellitus with other circulatory complication, without long-term current use of insulin  -     MicroAlbumin, Urine, Random - Urine, Clean Catch; Future  -     Hemoglobin A1c; Future  -     Comprehensive Metabolic Panel; Future  -     Dulaglutide 1.5 MG/0.5ML solution pen-injector; Inject 1.5 mg under the skin into the appropriate area as directed 1 (One) Time Per Week.  Dispense: 6.5 mL; Refill: 3  -     empagliflozin (Jardiance) 25 MG tablet tablet; Take 1 tablet by mouth Every Morning.  Dispense: 90 tablet; Refill: 3  -     metFORMIN ER (GLUCOPHAGE-XR) 500 MG 24 hr tablet; Take 2 tablets by mouth Daily.  Dispense: 180 tablet; Refill: 1    3. Essential hypertension  -     Comprehensive Metabolic Panel; Future  -     lisinopril-hydrochlorothiazide (PRINZIDE,ZESTORETIC) 20-25 MG per tablet; Take 1 tablet by mouth Daily.  Dispense: 90 tablet; Refill: 3    4. CAD  -     Lipid Panel; Future  -     rosuvastatin (CRESTOR) 20 MG tablet; Take 1 tablet by mouth Daily.  Dispense: 90 tablet; Refill: 3    5. Hyperlipidemia LDL goal <70  -     Lipid Panel; Future  -     rosuvastatin (CRESTOR) 20 MG tablet; Take 1 tablet by mouth Daily.  Dispense: 90 tablet; Refill: 3    6. Umbilical hernia without obstruction and without gangrene  -     Ambulatory Referral to General Surgery    7. Encounter for immunization  -     Pneumococcal Conjugate Vaccine 20-Valent (PCV20)    Other orders  -     PARoxetine (PAXIL) 20 MG tablet; Take 1 tablet by mouth Daily.  Dispense: 90 tablet; Refill: 1    Follow Up   Return in about 6 months (around 11/26/2023) for Recheck.  Patient was given instructions and counseling regarding his condition or for health maintenance advice. Please see specific information pulled into the AVS if appropriate.

## 2023-06-20 PROBLEM — K42.9 UMBILICAL HERNIA WITHOUT OBSTRUCTION AND WITHOUT GANGRENE: Status: ACTIVE | Noted: 2023-06-20

## 2023-07-31 ENCOUNTER — PATIENT MESSAGE (OUTPATIENT)
Dept: FAMILY MEDICINE CLINIC | Age: 58
End: 2023-07-31
Payer: COMMERCIAL

## 2023-07-31 DIAGNOSIS — G47.30 SLEEP APNEA, UNSPECIFIED TYPE: Primary | ICD-10-CM

## 2023-08-30 ENCOUNTER — LAB (OUTPATIENT)
Dept: LAB | Facility: HOSPITAL | Age: 58
End: 2023-08-30
Payer: COMMERCIAL

## 2023-08-30 ENCOUNTER — TELEPHONE (OUTPATIENT)
Dept: FAMILY MEDICINE CLINIC | Age: 58
End: 2023-08-30
Payer: COMMERCIAL

## 2023-08-30 DIAGNOSIS — E83.52 HYPERCALCEMIA: ICD-10-CM

## 2023-08-30 DIAGNOSIS — E11.59 TYPE 2 DIABETES MELLITUS WITH OTHER CIRCULATORY COMPLICATION, WITHOUT LONG-TERM CURRENT USE OF INSULIN: ICD-10-CM

## 2023-08-30 DIAGNOSIS — I10 ESSENTIAL HYPERTENSION: ICD-10-CM

## 2023-08-30 LAB
ALBUMIN SERPL-MCNC: 5.4 G/DL (ref 3.5–5.2)
ALBUMIN/GLOB SERPL: 2.3 G/DL
ALP SERPL-CCNC: 74 U/L (ref 39–117)
ALT SERPL W P-5'-P-CCNC: 39 U/L (ref 1–41)
ANION GAP SERPL CALCULATED.3IONS-SCNC: 13 MMOL/L (ref 5–15)
AST SERPL-CCNC: 31 U/L (ref 1–40)
BILIRUB SERPL-MCNC: 0.6 MG/DL (ref 0–1.2)
BUN SERPL-MCNC: 15 MG/DL (ref 6–20)
BUN/CREAT SERPL: 11.3 (ref 7–25)
CALCIUM SPEC-SCNC: 10 MG/DL (ref 8.6–10.5)
CHLORIDE SERPL-SCNC: 99 MMOL/L (ref 98–107)
CO2 SERPL-SCNC: 27 MMOL/L (ref 22–29)
CREAT SERPL-MCNC: 1.33 MG/DL (ref 0.76–1.27)
EGFRCR SERPLBLD CKD-EPI 2021: 62.7 ML/MIN/1.73
GLOBULIN UR ELPH-MCNC: 2.4 GM/DL
GLUCOSE SERPL-MCNC: 109 MG/DL (ref 65–99)
HBA1C MFR BLD: 6.9 % (ref 4.8–5.6)
POTASSIUM SERPL-SCNC: 4.3 MMOL/L (ref 3.5–5.2)
PROT SERPL-MCNC: 7.8 G/DL (ref 6–8.5)
PTH-INTACT SERPL-MCNC: 33.9 PG/ML (ref 15–65)
SODIUM SERPL-SCNC: 139 MMOL/L (ref 136–145)

## 2023-08-30 PROCEDURE — 80053 COMPREHEN METABOLIC PANEL: CPT

## 2023-08-30 PROCEDURE — 83036 HEMOGLOBIN GLYCOSYLATED A1C: CPT

## 2023-08-30 PROCEDURE — 36415 COLL VENOUS BLD VENIPUNCTURE: CPT

## 2023-08-30 PROCEDURE — 83970 ASSAY OF PARATHORMONE: CPT

## 2023-08-30 NOTE — TELEPHONE ENCOUNTER
----- Message from Marifer Cole LPN sent at 5/30/2023  8:40 AM EDT -----  TICKLE A1c, CMP, PTH intact in 90 days for diabetes, hypertension, hypercalcemia

## 2023-08-30 NOTE — TELEPHONE ENCOUNTER
Pt inf re tickle, orders placed please sign.   DISPLAY PLAN FREE TEXT DISPLAY PLAN FREE TEXT DISPLAY PLAN FREE TEXT DISPLAY PLAN FREE TEXT DISPLAY PLAN FREE TEXT DISPLAY PLAN FREE TEXT DISPLAY PLAN FREE TEXT DISPLAY PLAN FREE TEXT DISPLAY PLAN FREE TEXT DISPLAY PLAN FREE TEXT

## 2023-09-21 ENCOUNTER — LAB (OUTPATIENT)
Dept: LAB | Facility: HOSPITAL | Age: 58
End: 2023-09-21

## 2023-09-21 ENCOUNTER — TELEPHONE (OUTPATIENT)
Dept: FAMILY MEDICINE CLINIC | Age: 58
End: 2023-09-21
Payer: COMMERCIAL

## 2023-09-21 DIAGNOSIS — R79.89 ELEVATED SERUM CREATININE: ICD-10-CM

## 2023-09-21 DIAGNOSIS — R79.89 ELEVATED SERUM CREATININE: Primary | ICD-10-CM

## 2023-09-21 LAB
ANION GAP SERPL CALCULATED.3IONS-SCNC: 11 MMOL/L (ref 5–15)
BACTERIA UR QL AUTO: ABNORMAL /HPF
BILIRUB UR QL STRIP: NEGATIVE
BUN SERPL-MCNC: 16 MG/DL (ref 6–20)
BUN/CREAT SERPL: 16 (ref 7–25)
CALCIUM SPEC-SCNC: 9.3 MG/DL (ref 8.6–10.5)
CHLORIDE SERPL-SCNC: 101 MMOL/L (ref 98–107)
CLARITY UR: CLEAR
CO2 SERPL-SCNC: 26 MMOL/L (ref 22–29)
COLOR UR: YELLOW
CREAT SERPL-MCNC: 1 MG/DL (ref 0.76–1.27)
EGFRCR SERPLBLD CKD-EPI 2021: 88.3 ML/MIN/1.73
GLUCOSE SERPL-MCNC: 202 MG/DL (ref 65–99)
GLUCOSE UR STRIP-MCNC: ABNORMAL MG/DL
HGB UR QL STRIP.AUTO: ABNORMAL
KETONES UR QL STRIP: NEGATIVE
LEUKOCYTE ESTERASE UR QL STRIP.AUTO: NEGATIVE
NITRITE UR QL STRIP: NEGATIVE
PH UR STRIP.AUTO: 7 [PH] (ref 5–8)
POTASSIUM SERPL-SCNC: 4.6 MMOL/L (ref 3.5–5.2)
PROT UR QL STRIP: NEGATIVE
RBC # UR STRIP: ABNORMAL /HPF
REF LAB TEST METHOD: ABNORMAL
SODIUM SERPL-SCNC: 138 MMOL/L (ref 136–145)
SP GR UR STRIP: 1.01 (ref 1–1.03)
SQUAMOUS #/AREA URNS HPF: ABNORMAL /HPF
UROBILINOGEN UR QL STRIP: ABNORMAL
WBC # UR STRIP: ABNORMAL /HPF

## 2023-09-21 PROCEDURE — 36415 COLL VENOUS BLD VENIPUNCTURE: CPT

## 2023-09-21 PROCEDURE — 80048 BASIC METABOLIC PNL TOTAL CA: CPT

## 2023-09-21 PROCEDURE — 81001 URINALYSIS AUTO W/SCOPE: CPT

## 2023-09-21 NOTE — TELEPHONE ENCOUNTER
----- Message from Marifer Cole LPN sent at 8/31/2023  8:56 AM EDT -----  TICKLE BMP and urinalysis with micro in 3 weeks.  He should have plenty to drink before he comes.

## 2023-09-24 DIAGNOSIS — E78.5 HYPERLIPIDEMIA LDL GOAL <70: ICD-10-CM

## 2023-09-25 RX ORDER — EZETIMIBE 10 MG/1
TABLET ORAL
Qty: 90 TABLET | Refills: 2 | Status: SHIPPED | OUTPATIENT
Start: 2023-09-25

## 2023-09-26 ENCOUNTER — OFFICE VISIT (OUTPATIENT)
Dept: PULMONOLOGY | Facility: CLINIC | Age: 58
End: 2023-09-26
Payer: COMMERCIAL

## 2023-09-26 VITALS
WEIGHT: 251.2 LBS | TEMPERATURE: 98.6 F | DIASTOLIC BLOOD PRESSURE: 79 MMHG | SYSTOLIC BLOOD PRESSURE: 124 MMHG | BODY MASS INDEX: 32.24 KG/M2 | RESPIRATION RATE: 16 BRPM | HEART RATE: 81 BPM | OXYGEN SATURATION: 94 % | HEIGHT: 74 IN

## 2023-09-26 DIAGNOSIS — E66.3 OVERWEIGHT: ICD-10-CM

## 2023-09-26 DIAGNOSIS — G47.33 OBSTRUCTIVE SLEEP APNEA: Primary | ICD-10-CM

## 2023-09-26 NOTE — PROGRESS NOTES
Pulmonary Consultation    Stevan Rubio,*,    Thank you for asking me to see Kurt Saucedo for   Chief Complaint   Patient presents with    Follow-up    Sleep Apnea   .      History of Present Illness  Kurt Saucedo is a 58 y.o. male with a PMH significant for hypertension diabetes mellitus and obstructive sleep apnea on CPAP presents for evaluation and to establish as his physician retired patient has been using his CPAP regularly at night and seems to be well satisfied he denies any shortness of breath cough wheeze or swelling of his extremities patient has been staying pretty active      Tobacco use history:  Former smoker      Review of Systems: History obtained from chart review and the patient.  Review of Systems   All other systems reviewed and are negative.  As described in the HPI. Otherwise, remainder of ROS (14 systems) were negative.    Patient Active Problem List   Diagnosis    CAD    Essential hypertension    Hyperlipidemia LDL goal <70    Type 2 diabetes mellitus    Screening for malignant neoplasm of colon    History of colonic polyps    Umbilical hernia without obstruction and without gangrene         Current Outpatient Medications:     aspirin 81 MG EC tablet, Take 1 tablet by mouth Daily., Disp: , Rfl:     Brilinta 60 MG tablet tablet, TAKE ONE TABLET BY MOUTH TWICE A DAY, Disp: 180 tablet, Rfl: 3    Dulaglutide 1.5 MG/0.5ML solution pen-injector, Inject 1.5 mg under the skin into the appropriate area as directed 1 (One) Time Per Week., Disp: 6.5 mL, Rfl: 3    empagliflozin (Jardiance) 25 MG tablet tablet, Take 1 tablet by mouth Every Morning., Disp: 90 tablet, Rfl: 3    ezetimibe (ZETIA) 10 MG tablet, TAKE ONE TABLET BY MOUTH DAILY, Disp: 90 tablet, Rfl: 2    HYDROcodone-acetaminophen (NORCO) 5-325 MG per tablet, Take 1-2 tablets by mouth Every 4 (Four) Hours As Needed (Pain)., Disp: 10 tablet, Rfl: 0    lisinopril-hydrochlorothiazide (PRINZIDE,ZESTORETIC) 20-25 MG per tablet,  Take 1 tablet by mouth Daily., Disp: 90 tablet, Rfl: 3    metFORMIN ER (GLUCOPHAGE-XR) 500 MG 24 hr tablet, Take 2 tablets by mouth Daily., Disp: 180 tablet, Rfl: 1    metoprolol succinate XL (TOPROL-XL) 25 MG 24 hr tablet, TAKE ONE TABLET BY MOUTH DAILY, Disp: 90 tablet, Rfl: 1    PARoxetine (PAXIL) 20 MG tablet, Take 1 tablet by mouth Daily., Disp: 90 tablet, Rfl: 1    rosuvastatin (CRESTOR) 20 MG tablet, Take 1 tablet by mouth Daily., Disp: 90 tablet, Rfl: 3    No Known Allergies    Past Medical History:   Diagnosis Date    CAD 10/04/2021    non-ST-elevation myocardial infarction, 100% occlusion of the RCA    Colon polyp     At last colonoscopy    Essential hypertension 10/04/2021    Hyperlipidemia LDL goal <70 10/04/2021    Myocardial infarction 2019    Sleep apnea     Type 2 diabetes mellitus     Umbilical hernia     Ventral incisional hernia      Past Surgical History:   Procedure Laterality Date    BONY PELVIS SURGERY      hardware in pelvis area    COLONOSCOPY  2017    Tubular adenoma    COLONOSCOPY N/A 2022    Procedure: COLONOSCOPY;  Surgeon: Artie Baltazar MD;  Location: [facility] ENDOSCOPY;  Service: General;  Laterality: N/A;  HEMORRHOIDS    TONSILLECTOMY      TOTAL HIP ARTHROPLASTY Left 2018    VENTRAL HERNIA REPAIR N/A 2023    Procedure: VENTRAL / INCISIONAL HERNIA REPAIR LAPAROSCOPIC WITH DAVINCI ROBOT;  Surgeon: Artie Baltazar MD;  Location: [facility];  Service: Robotics - DaVinci;  Laterality: N/A;     Social History     Socioeconomic History    Marital status:    Tobacco Use    Smoking status: Former     Packs/day: 1.50     Years: 34.00     Pack years: 51.00     Types: Cigarettes     Start date: 1983     Quit date: 2017     Years since quittin.6    Smokeless tobacco: Current     Types: Snuff   Vaping Use    Vaping Use: Never used   Substance and Sexual Activity    Alcohol use: Yes     Comment: occasionally    Drug use: Never    Sexual activity: Yes  "    Partners: Female     Birth control/protection: Surgical, None     Family History   Problem Relation Age of Onset    Heart failure Father     Atrial fibrillation Father     COPD Father     Diabetes type II Father     Hypertension Father     Heart disease Father     Malig Hyperthermia Neg Hx        No radiology results for the last 90 days.        Objective     Blood pressure 124/79, pulse 81, temperature 98.6 °F (37 °C), temperature source Tympanic, resp. rate 16, height 188 cm (74\"), weight 114 kg (251 lb 3.2 oz), SpO2 94 %.  Physical Exam  Vitals and nursing note reviewed.   Constitutional:       Appearance: Normal appearance.   HENT:      Head: Normocephalic and atraumatic.      Nose: Nose normal.      Mouth/Throat:      Mouth: Mucous membranes are moist.      Pharynx: Oropharynx is clear.   Eyes:      Extraocular Movements: Extraocular movements intact.      Conjunctiva/sclera: Conjunctivae normal.      Pupils: Pupils are equal, round, and reactive to light.   Cardiovascular:      Rate and Rhythm: Normal rate and regular rhythm.      Pulses: Normal pulses.      Heart sounds: Normal heart sounds.   Pulmonary:      Effort: Pulmonary effort is normal.      Breath sounds: Normal breath sounds.   Abdominal:      General: Abdomen is flat. Bowel sounds are normal.      Palpations: Abdomen is soft.   Musculoskeletal:         General: Normal range of motion.      Cervical back: Normal range of motion and neck supple.   Skin:     General: Skin is warm.      Capillary Refill: Capillary refill takes 2 to 3 seconds.   Neurological:      General: No focal deficit present.      Mental Status: He is alert and oriented to person, place, and time.   Psychiatric:         Mood and Affect: Mood normal.         Behavior: Behavior normal.     Immunization History   Administered Date(s) Administered    COVID-19 (PFIZER) BIVALENT 12+YRS 11/28/2022    COVID-19 (PFIZER) Purple Cap Monovalent 03/20/2021, 04/10/2021, 11/30/2021, " 12/01/2021    Flu Vaccine Intradermal Quad 18-64YR 10/19/2021, 11/01/2021    Fluzone (or Fluarix & Flulaval for VFC) >6mos 10/25/2022    Influenza Injectable Mdck Pf Quad 10/19/2021, 10/25/2022    Pneumococcal Conjugate 20-Valent (PCV20) 05/26/2023    Pneumococcal Polysaccharide (PPSV23) 12/09/2021            Assessment & Plan     Diagnoses and all orders for this visit:    1. Obstructive sleep apnea (Primary)    2. Overweight         Discussion/ Recommendations:   Will order supplies for his CPAP  Patient seems to be well compliant with his CPAP  Continue regular exercise and weight reduction  Discussed vaccination and recommended                Return in about 6 months (around 3/26/2024).      Thank you for allowing me to participate in the care of Kurt TIP Sheryl. Please do not hesitate to contact me with any questions.         This document has been electronically signed by Marty Johnston MD on September 26, 2023 10:34 EDT

## 2023-11-08 ENCOUNTER — TELEPHONE (OUTPATIENT)
Dept: FAMILY MEDICINE CLINIC | Age: 58
End: 2023-11-08
Payer: COMMERCIAL

## 2023-11-08 DIAGNOSIS — F17.210 NICOTINE DEPENDENCE, CIGARETTES, UNCOMPLICATED: Primary | ICD-10-CM

## 2023-11-13 RX ORDER — METOPROLOL SUCCINATE 25 MG/1
25 TABLET, EXTENDED RELEASE ORAL DAILY
Qty: 90 TABLET | Refills: 0 | Status: SHIPPED | OUTPATIENT
Start: 2023-11-13

## 2023-11-15 ENCOUNTER — OFFICE VISIT (OUTPATIENT)
Dept: CARDIOLOGY | Facility: CLINIC | Age: 58
End: 2023-11-15
Payer: COMMERCIAL

## 2023-11-15 VITALS
HEART RATE: 73 BPM | SYSTOLIC BLOOD PRESSURE: 117 MMHG | WEIGHT: 248 LBS | BODY MASS INDEX: 31.83 KG/M2 | HEIGHT: 74 IN | DIASTOLIC BLOOD PRESSURE: 71 MMHG

## 2023-11-15 DIAGNOSIS — I10 ESSENTIAL HYPERTENSION: ICD-10-CM

## 2023-11-15 DIAGNOSIS — E78.5 HYPERLIPIDEMIA LDL GOAL <70: ICD-10-CM

## 2023-11-15 DIAGNOSIS — I25.10 CORONARY ARTERY DISEASE INVOLVING NATIVE CORONARY ARTERY OF NATIVE HEART WITHOUT ANGINA PECTORIS: Primary | ICD-10-CM

## 2023-11-15 NOTE — PROGRESS NOTES
Chief Complaint  Coronary Artery Disease, Hypertension, and Follow-up    Subjective            History of Present Illness  Kurt Saucedo is a 58-year-old white/ male patient who presents to the office today for follow-up.  He has CAD, hypertension, and hyperlipidemia.  He is compliant with medications.  He denies any chest pain, shortness of breath, lightheadedness/dizziness, palpitations, or edema.    PMH  Past Medical History:   Diagnosis Date    CAD 10/04/2021    non-ST-elevation myocardial infarction, 100% occlusion of the RCA    Colon polyp     At last colonoscopy    Essential hypertension 10/04/2021    Hyperlipidemia LDL goal <70 10/04/2021    Myocardial infarction 2019    Sleep apnea     Type 2 diabetes mellitus     Umbilical hernia     Ventral incisional hernia          ALLERGY  No Known Allergies       SURGICALHX  Past Surgical History:   Procedure Laterality Date    BONY PELVIS SURGERY      hardware in pelvis area    COLONOSCOPY  2017    Tubular adenoma    COLONOSCOPY N/A 2022    Procedure: COLONOSCOPY;  Surgeon: Artie Baltazar MD;  Location: [facility] ENDOSCOPY;  Service: General;  Laterality: N/A;  HEMORRHOIDS    TONSILLECTOMY      TOTAL HIP ARTHROPLASTY Left 2018    VENTRAL HERNIA REPAIR N/A 2023    Procedure: VENTRAL / INCISIONAL HERNIA REPAIR LAPAROSCOPIC WITH DAVINCI ROBOT;  Surgeon: Artie Baltazar MD;  Location: [facility] OR [facility] [place];  Service: Robotics - DaVinci;  Laterality: N/A;          SOC  Social History     Socioeconomic History    Marital status:    Tobacco Use    Smoking status: Former     Packs/day: 1.50     Years: 34.00     Additional pack years: 0.00     Total pack years: 51.00     Types: Cigarettes     Start date: 1983     Quit date: 2017     Years since quittin.7    Smokeless tobacco: Current     Types: Snuff   Vaping Use    Vaping Use: Never used   Substance and Sexual Activity    Alcohol use: Yes     Comment: occasionally    Drug use:  "Never    Sexual activity: Yes     Partners: Female     Birth control/protection: Surgical, None         FAMHX  Family History   Problem Relation Age of Onset    Heart failure Father     Atrial fibrillation Father     COPD Father     Diabetes type II Father     Hypertension Father     Heart disease Father     Malig Hyperthermia Neg Hx           MEDSIGONLY  Current Outpatient Medications on File Prior to Visit   Medication Sig    aspirin 81 MG EC tablet Take 1 tablet by mouth Daily.    Brilinta 60 MG tablet tablet TAKE ONE TABLET BY MOUTH TWICE A DAY    Dulaglutide 1.5 MG/0.5ML solution pen-injector Inject 1.5 mg under the skin into the appropriate area as directed 1 (One) Time Per Week.    empagliflozin (Jardiance) 25 MG tablet tablet Take 1 tablet by mouth Every Morning.    ezetimibe (ZETIA) 10 MG tablet TAKE ONE TABLET BY MOUTH DAILY    lisinopril-hydrochlorothiazide (PRINZIDE,ZESTORETIC) 20-25 MG per tablet Take 1 tablet by mouth Daily.    metFORMIN ER (GLUCOPHAGE-XR) 500 MG 24 hr tablet Take 2 tablets by mouth Daily.    metoprolol succinate XL (TOPROL-XL) 25 MG 24 hr tablet TAKE 1 TABLET BY MOUTH DAILY    PARoxetine (PAXIL) 20 MG tablet Take 1 tablet by mouth Daily.    rosuvastatin (CRESTOR) 20 MG tablet Take 1 tablet by mouth Daily.    HYDROcodone-acetaminophen (NORCO) 5-325 MG per tablet Take 1-2 tablets by mouth Every 4 (Four) Hours As Needed (Pain). (Patient not taking: Reported on 11/15/2023)     No current facility-administered medications on file prior to visit.         Objective   /71 (BP Location: Left arm)   Pulse 73   Ht 188 cm (74\")   Wt 112 kg (248 lb)   BMI 31.84 kg/m²       Physical Exam  Constitutional:       Appearance: He is obese.   HENT:      Head: Normocephalic.   Neck:      Vascular: No carotid bruit.   Cardiovascular:      Rate and Rhythm: Normal rate and regular rhythm.      Pulses: Normal pulses.      Heart sounds: Normal heart sounds. No murmur heard.  Pulmonary:      Effort: " "Pulmonary effort is normal.      Breath sounds: Normal breath sounds.   Musculoskeletal:      Cervical back: Neck supple.      Right lower leg: No edema.      Left lower leg: No edema.   Skin:     General: Skin is dry.   Neurological:      Mental Status: He is alert and oriented to person, place, and time.   Psychiatric:         Behavior: Behavior normal.       ECG 12 Lead    Date/Time: 11/15/2023 12:21 PM  Performed by: Karli Allan APRN    Authorized by: Karli Allan APRN  Comparison: compared with previous ECG from 10/28/2022  Similar to previous ECG  Rhythm: sinus rhythm  Rate: normal  BPM: 68  Conduction: conduction normal  ST Segments: ST segments normal  T Waves: T waves normal  QRS axis: normal  Other: no other findings    Clinical impression: normal ECG      Result Review :   The following data was reviewed by: DEBORAH López on 11/15/2023:  No results found for: \"PROBNP\"  CMP          9/21/2023    14:14   CMP   Glucose 202    BUN 16    Creatinine 1.00    EGFR 88.3    Sodium 138    Potassium 4.6    Chloride 101    Calcium 9.3    Total Protein    Albumin    Globulin    Total Bilirubin    Alkaline Phosphatase    AST (SGOT)    ALT (SGPT)    Albumin/Globulin Ratio    BUN/Creatinine Ratio 16.0    Anion Gap 11.0      CBC w/diff          12/12/2022    14:14   CBC w/Diff   WBC 8.54    RBC 5.27    Hemoglobin 16.2    Hematocrit 47.7    MCV 90.5    MCH 30.7    MCHC 34.0    RDW 12.0    Platelets 347       Lab Results   Component Value Date    TSH 0.765 12/12/2022      Lab Results   Component Value Date    FREET4 1.1 02/20/2019      No results found for: \"DDIMERQUANT\"  Magnesium   Date Value Ref Range Status   02/18/2019 2.05 1.60 - 2.30 mg/dL Final      No results found for: \"DIGOXIN\"   Lab Results   Component Value Date    TROPONINT 0.56 (H) 02/20/2019           Lipid Panel          5/26/2023    13:33   Lipid Panel   Total Cholesterol 103    Triglycerides 115    HDL Cholesterol 32    VLDL " Cholesterol 21    LDL Cholesterol  50    LDL/HDL Ratio 1.50             Assessment and Plan    Diagnoses and all orders for this visit:    1. CAD (Primary)  He denies any anginal symptoms, continue aspirin 81 mg daily and Brilinta 60 mg twice daily.    2. Essential hypertension  Currently controlled and without adverse effects from medication, continue lisinopril-HCTZ 20-25 mg daily and metoprolol 25 mg daily.    3. Hyperlipidemia LDL goal <70  Last lipid panel was 5/26/2023 with LDL 50 which is within goal range, continue rosuvastatin 20 mg daily and ezetimibe 10 mg daily.      Other orders  -     ECG 12 Lead        Follow Up   Return in about 6 months (around 5/15/2024) for Follow up with Dr Treadwell.    Patient was given instructions and counseling regarding his condition or for health maintenance advice. Please see specific information pulled into the AVS if appropriate.     Kurt Saucedo  reports that he quit smoking about 6 years ago. His smoking use included cigarettes. He started smoking about 40 years ago. He has a 51.00 pack-year smoking history. His smokeless tobacco use includes snuff.           Karli Allan, DEBORAH  11/15/23  11:47 EST    Dictated Utilizing Dragon Dictation

## 2023-11-21 ENCOUNTER — OFFICE VISIT (OUTPATIENT)
Dept: FAMILY MEDICINE CLINIC | Age: 58
End: 2023-11-21
Payer: COMMERCIAL

## 2023-11-21 VITALS
HEIGHT: 74 IN | TEMPERATURE: 98.2 F | HEART RATE: 86 BPM | BODY MASS INDEX: 32.24 KG/M2 | SYSTOLIC BLOOD PRESSURE: 117 MMHG | WEIGHT: 251.2 LBS | DIASTOLIC BLOOD PRESSURE: 64 MMHG

## 2023-11-21 DIAGNOSIS — Z12.5 SCREENING FOR PROSTATE CANCER: ICD-10-CM

## 2023-11-21 DIAGNOSIS — E78.5 HYPERLIPIDEMIA LDL GOAL <70: ICD-10-CM

## 2023-11-21 DIAGNOSIS — I10 ESSENTIAL HYPERTENSION: ICD-10-CM

## 2023-11-21 DIAGNOSIS — E11.59 TYPE 2 DIABETES MELLITUS WITH OTHER CIRCULATORY COMPLICATION, WITHOUT LONG-TERM CURRENT USE OF INSULIN: Primary | ICD-10-CM

## 2023-11-21 DIAGNOSIS — I25.10 CORONARY ARTERY DISEASE INVOLVING NATIVE CORONARY ARTERY OF NATIVE HEART WITHOUT ANGINA PECTORIS: ICD-10-CM

## 2023-11-21 DIAGNOSIS — Z79.899 OTHER LONG TERM (CURRENT) DRUG THERAPY: ICD-10-CM

## 2023-11-21 PROCEDURE — 99214 OFFICE O/P EST MOD 30 MIN: CPT | Performed by: FAMILY MEDICINE

## 2023-11-21 RX ORDER — PAROXETINE HYDROCHLORIDE 20 MG/1
20 TABLET, FILM COATED ORAL DAILY
Qty: 90 TABLET | Refills: 1 | Status: SHIPPED | OUTPATIENT
Start: 2023-11-21

## 2023-11-21 RX ORDER — METFORMIN HYDROCHLORIDE 500 MG/1
1000 TABLET, EXTENDED RELEASE ORAL DAILY
Qty: 180 TABLET | Refills: 1 | Status: SHIPPED | OUTPATIENT
Start: 2023-11-21
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
## 2023-11-29 ENCOUNTER — HOSPITAL ENCOUNTER (OUTPATIENT)
Dept: CT IMAGING | Facility: HOSPITAL | Age: 58
Discharge: HOME | End: 2023-11-29
Admitting: FAMILY MEDICINE
Payer: COMMERCIAL

## 2023-11-29 DIAGNOSIS — F17.210 NICOTINE DEPENDENCE, CIGARETTES, UNCOMPLICATED: ICD-10-CM

## 2023-11-29 PROCEDURE — 71271 CT THORAX LUNG CANCER SCR C-: CPT

## 2023-12-21 ENCOUNTER — LAB (OUTPATIENT)
Dept: LAB | Facility: HOSPITAL | Age: 58
End: 2023-12-21
Payer: COMMERCIAL

## 2023-12-21 DIAGNOSIS — I25.10 CORONARY ARTERY DISEASE INVOLVING NATIVE CORONARY ARTERY OF NATIVE HEART WITHOUT ANGINA PECTORIS: ICD-10-CM

## 2023-12-21 DIAGNOSIS — Z79.899 OTHER LONG TERM (CURRENT) DRUG THERAPY: ICD-10-CM

## 2023-12-21 DIAGNOSIS — Z12.5 SCREENING FOR PROSTATE CANCER: ICD-10-CM

## 2023-12-21 DIAGNOSIS — I10 ESSENTIAL HYPERTENSION: ICD-10-CM

## 2023-12-21 DIAGNOSIS — E11.59 TYPE 2 DIABETES MELLITUS WITH OTHER CIRCULATORY COMPLICATION, WITHOUT LONG-TERM CURRENT USE OF INSULIN: ICD-10-CM

## 2023-12-21 DIAGNOSIS — E78.5 HYPERLIPIDEMIA LDL GOAL <70: ICD-10-CM

## 2023-12-21 LAB
ALBUMIN SERPL-MCNC: 5.3 G/DL (ref 3.5–5.2)
ALBUMIN/GLOB SERPL: 2.4 G/DL
ALP SERPL-CCNC: 73 U/L (ref 39–117)
ALT SERPL W P-5'-P-CCNC: 46 U/L (ref 1–41)
ANION GAP SERPL CALCULATED.3IONS-SCNC: 13 MMOL/L (ref 5–15)
AST SERPL-CCNC: 23 U/L (ref 1–40)
BILIRUB SERPL-MCNC: 0.6 MG/DL (ref 0–1.2)
BUN SERPL-MCNC: 17 MG/DL (ref 6–20)
BUN/CREAT SERPL: 16 (ref 7–25)
CALCIUM SPEC-SCNC: 10.3 MG/DL (ref 8.6–10.5)
CHLORIDE SERPL-SCNC: 98 MMOL/L (ref 98–107)
CHOLEST SERPL-MCNC: 108 MG/DL (ref 0–200)
CO2 SERPL-SCNC: 26 MMOL/L (ref 22–29)
CREAT SERPL-MCNC: 1.06 MG/DL (ref 0.76–1.27)
DEPRECATED RDW RBC AUTO: 42.6 FL (ref 37–54)
EGFRCR SERPLBLD CKD-EPI 2021: 82.4 ML/MIN/1.73
ERYTHROCYTE [DISTWIDTH] IN BLOOD BY AUTOMATED COUNT: 12.3 % (ref 12.3–15.4)
GLOBULIN UR ELPH-MCNC: 2.2 GM/DL
GLUCOSE SERPL-MCNC: 159 MG/DL (ref 65–99)
HCT VFR BLD AUTO: 49.9 % (ref 37.5–51)
HDLC SERPL-MCNC: 31 MG/DL (ref 40–60)
HGB BLD-MCNC: 16.2 G/DL (ref 13–17.7)
LDLC SERPL CALC-MCNC: 49 MG/DL (ref 0–100)
LDLC/HDLC SERPL: 1.43 {RATIO}
MCH RBC QN AUTO: 30 PG (ref 26.6–33)
MCHC RBC AUTO-ENTMCNC: 32.5 G/DL (ref 31.5–35.7)
MCV RBC AUTO: 92.4 FL (ref 79–97)
PLATELET # BLD AUTO: 324 10*3/MM3 (ref 140–450)
PMV BLD AUTO: 9.2 FL (ref 6–12)
POTASSIUM SERPL-SCNC: 4.3 MMOL/L (ref 3.5–5.2)
PROT SERPL-MCNC: 7.5 G/DL (ref 6–8.5)
PSA SERPL-MCNC: 0.5 NG/ML (ref 0–4)
RBC # BLD AUTO: 5.4 10*6/MM3 (ref 4.14–5.8)
SODIUM SERPL-SCNC: 137 MMOL/L (ref 136–145)
TRIGL SERPL-MCNC: 164 MG/DL (ref 0–150)
TSH SERPL DL<=0.05 MIU/L-ACNC: 0.96 UIU/ML (ref 0.27–4.2)
VLDLC SERPL-MCNC: 28 MG/DL (ref 5–40)
WBC NRBC COR # BLD AUTO: 8.95 10*3/MM3 (ref 3.4–10.8)

## 2023-12-21 PROCEDURE — 83036 HEMOGLOBIN GLYCOSYLATED A1C: CPT | Performed by: FAMILY MEDICINE

## 2023-12-21 PROCEDURE — G0103 PSA SCREENING: HCPCS

## 2023-12-21 PROCEDURE — 80061 LIPID PANEL: CPT

## 2023-12-21 PROCEDURE — 84443 ASSAY THYROID STIM HORMONE: CPT

## 2023-12-21 PROCEDURE — 36415 COLL VENOUS BLD VENIPUNCTURE: CPT

## 2023-12-21 PROCEDURE — 85027 COMPLETE CBC AUTOMATED: CPT

## 2023-12-21 PROCEDURE — 80053 COMPREHEN METABOLIC PANEL: CPT

## 2023-12-22 LAB — HBA1C MFR BLD: 8 % (ref 4.8–5.6)

## 2023-12-29 DIAGNOSIS — E11.59 TYPE 2 DIABETES MELLITUS WITH OTHER CIRCULATORY COMPLICATION, WITHOUT LONG-TERM CURRENT USE OF INSULIN: ICD-10-CM

## 2024-01-26 ENCOUNTER — TELEPHONE (OUTPATIENT)
Dept: FAMILY MEDICINE CLINIC | Age: 59
End: 2024-01-26
Payer: COMMERCIAL

## 2024-01-26 DIAGNOSIS — E11.59 TYPE 2 DIABETES MELLITUS WITH OTHER CIRCULATORY COMPLICATION, WITHOUT LONG-TERM CURRENT USE OF INSULIN: ICD-10-CM

## 2024-01-26 DIAGNOSIS — E66.09 CLASS 1 OBESITY DUE TO EXCESS CALORIES WITH SERIOUS COMORBIDITY AND BODY MASS INDEX (BMI) OF 32.0 TO 32.9 IN ADULT: Primary | ICD-10-CM

## 2024-01-26 NOTE — TELEPHONE ENCOUNTER
----- Message from Marifer Cole LPN sent at 12/29/2023  9:39 AM EST -----  TICKLE to call him in 4 weeks.  Should we increase to 7.5 mg weekly?  Let me know if he has other concerns.  Thanks.(Mounjaro)

## 2024-01-31 NOTE — TELEPHONE ENCOUNTER
Pt states he has not been taking the Mounjaro due to it being unavailable at pharmacies. He has been taking the Trulicity 1.5mg he was on.

## 2024-01-31 NOTE — TELEPHONE ENCOUNTER
Noted.  Confirm that he is doing okay with the 1.5 mg dose of Trulicity.  Also, see how much of it he has left.  I would be inclined to increase his dose to 3.0 mg at this time.  Let me know to which pharmacy he would like that sent.  Thanks.

## 2024-02-02 PROBLEM — E66.09 CLASS 1 OBESITY DUE TO EXCESS CALORIES WITH SERIOUS COMORBIDITY AND BODY MASS INDEX (BMI) OF 32.0 TO 32.9 IN ADULT: Status: ACTIVE | Noted: 2024-02-02

## 2024-02-02 PROBLEM — E66.811 CLASS 1 OBESITY DUE TO EXCESS CALORIES WITH SERIOUS COMORBIDITY AND BODY MASS INDEX (BMI) OF 32.0 TO 32.9 IN ADULT: Status: ACTIVE | Noted: 2024-02-02

## 2024-02-02 RX ORDER — DULAGLUTIDE 3 MG/.5ML
3 INJECTION, SOLUTION SUBCUTANEOUS WEEKLY
Qty: 6.5 ML | Refills: 3 | Status: SHIPPED | OUTPATIENT
Start: 2024-02-02

## 2024-02-02 NOTE — TELEPHONE ENCOUNTER
Pt states he has 2 of the 1.5mg doses left. He is ageeable to increasing dose. Uses [facility] in [place] by [address].

## 2024-02-08 RX ORDER — METOPROLOL SUCCINATE 25 MG/1
25 TABLET, EXTENDED RELEASE ORAL DAILY
Qty: 90 TABLET | Refills: 0 | Status: SHIPPED | OUTPATIENT
Start: 2024-02-08

## 2024-03-26 ENCOUNTER — OFFICE VISIT (OUTPATIENT)
Dept: PULMONOLOGY | Facility: CLINIC | Age: 59
End: 2024-03-26
Payer: COMMERCIAL

## 2024-03-26 VITALS
OXYGEN SATURATION: 95 % | WEIGHT: 253 LBS | HEIGHT: 74 IN | BODY MASS INDEX: 32.47 KG/M2 | RESPIRATION RATE: 14 BRPM | DIASTOLIC BLOOD PRESSURE: 80 MMHG | SYSTOLIC BLOOD PRESSURE: 141 MMHG | HEART RATE: 86 BPM | TEMPERATURE: 97.5 F

## 2024-03-26 DIAGNOSIS — G47.33 OBSTRUCTIVE SLEEP APNEA: Primary | ICD-10-CM

## 2024-03-26 DIAGNOSIS — E66.3 OVERWEIGHT: ICD-10-CM

## 2024-03-26 PROCEDURE — 99213 OFFICE O/P EST LOW 20 MIN: CPT | Performed by: INTERNAL MEDICINE

## 2024-03-26 NOTE — PROGRESS NOTES
Pulmonary Office Follow-up    Subjective     Kurt Saucedo is seen today at the office for   Chief Complaint   Patient presents with    Sleep Apnea    Follow-up         HPI  Kurt Saucedo is a 59 y.o. male with a PMH significant for obstructive sleep apnea hypertension and diabetes presents for follow-up patient is using his CPAP and appears to be compliant and it is working well on him he seems to be very satisfied with his sleep quality      Tobacco use history:  Former smoker      Patient Active Problem List   Diagnosis    CAD    Essential hypertension    Hyperlipidemia LDL goal <70    Type 2 diabetes mellitus    Screening for malignant neoplasm of colon    History of colonic polyps    Umbilical hernia without obstruction and without gangrene    Class 1 obesity due to excess calories with serious comorbidity and body mass index (BMI) of 32.0 to 32.9 in adult       Review of Systems  Review of Systems   All other systems reviewed and are negative.    As described in the HPI. Otherwise, remainder of ROS (14 systems) were negative.    Medications, Allergies, Social, and Family Histories reviewed as per EMR.    Result Review :            Objective     Vitals:    03/26/24 0958   BP: 141/80   Pulse: 86   Resp: 14   Temp: 97.5 °F (36.4 °C)   SpO2: 95%         03/26/24  0958   Weight: 115 kg (253 lb)       Physical Exam  Vitals and nursing note reviewed.   Constitutional:       Appearance: Normal appearance.   HENT:      Head: Normocephalic and atraumatic.      Nose: Nose normal.      Mouth/Throat:      Mouth: Mucous membranes are moist.      Pharynx: Oropharynx is clear.   Eyes:      Extraocular Movements: Extraocular movements intact.      Conjunctiva/sclera: Conjunctivae normal.      Pupils: Pupils are equal, round, and reactive to light.   Cardiovascular:      Rate and Rhythm: Normal rate and regular rhythm.      Pulses: Normal pulses.      Heart sounds: Normal heart sounds.   Pulmonary:      Effort:  Pulmonary effort is normal.      Breath sounds: Normal breath sounds.   Abdominal:      General: Abdomen is flat. Bowel sounds are normal.      Palpations: Abdomen is soft.   Musculoskeletal:         General: Normal range of motion.      Cervical back: Normal range of motion and neck supple.   Skin:     General: Skin is warm.      Capillary Refill: Capillary refill takes 2 to 3 seconds.   Neurological:      General: No focal deficit present.      Mental Status: He is alert and oriented to person, place, and time.   Psychiatric:         Mood and Affect: Mood normal.         Behavior: Behavior normal.         No radiology results for the last 90 days.     Assessment & Plan     Diagnoses and all orders for this visit:    1. Obstructive sleep apnea (Primary)    2. Overweight         Discussion/ Recommendations:   Patient is advised regular exercise and to reduce weight his BMI is 32.47  Advised compliance with CPAP  Proper sleep hygiene  Vaccinations discussed and recommended             Return in about 6 months (around 9/26/2024).          This document has been electronically signed by Marty Johnston MD on March 26, 2024 10:06 EDT

## 2024-05-08 RX ORDER — METOPROLOL SUCCINATE 25 MG/1
25 TABLET, EXTENDED RELEASE ORAL DAILY
Qty: 90 TABLET | Refills: 0 | Status: SHIPPED | OUTPATIENT
Start: 2024-05-08

## 2024-05-20 DIAGNOSIS — I10 ESSENTIAL HYPERTENSION: ICD-10-CM

## 2024-05-20 RX ORDER — LISINOPRIL AND HYDROCHLOROTHIAZIDE 25; 20 MG/1; MG/1
1 TABLET ORAL DAILY
Qty: 90 TABLET | Refills: 3 | Status: SHIPPED | OUTPATIENT
Start: 2024-05-20 | End: 2024-05-21 | Stop reason: SDUPTHER

## 2024-05-21 ENCOUNTER — OFFICE VISIT (OUTPATIENT)
Dept: FAMILY MEDICINE CLINIC | Age: 59
End: 2024-05-21
Payer: COMMERCIAL

## 2024-05-21 ENCOUNTER — LAB (OUTPATIENT)
Dept: LAB | Facility: HOSPITAL | Age: 59
End: 2024-05-21
Payer: COMMERCIAL

## 2024-05-21 VITALS
SYSTOLIC BLOOD PRESSURE: 123 MMHG | WEIGHT: 250.2 LBS | OXYGEN SATURATION: 96 % | HEART RATE: 78 BPM | DIASTOLIC BLOOD PRESSURE: 79 MMHG | TEMPERATURE: 98 F | HEIGHT: 74 IN | BODY MASS INDEX: 32.11 KG/M2

## 2024-05-21 DIAGNOSIS — E78.5 HYPERLIPIDEMIA LDL GOAL <70: ICD-10-CM

## 2024-05-21 DIAGNOSIS — Z00.00 PHYSICAL EXAM: ICD-10-CM

## 2024-05-21 DIAGNOSIS — I25.10 CORONARY ARTERY DISEASE INVOLVING NATIVE CORONARY ARTERY OF NATIVE HEART WITHOUT ANGINA PECTORIS: ICD-10-CM

## 2024-05-21 DIAGNOSIS — I10 ESSENTIAL HYPERTENSION: ICD-10-CM

## 2024-05-21 DIAGNOSIS — H61.23 BILATERAL IMPACTED CERUMEN: ICD-10-CM

## 2024-05-21 DIAGNOSIS — E11.59 TYPE 2 DIABETES MELLITUS WITH OTHER CIRCULATORY COMPLICATION, WITHOUT LONG-TERM CURRENT USE OF INSULIN: ICD-10-CM

## 2024-05-21 DIAGNOSIS — Z00.00 PHYSICAL EXAM: Primary | ICD-10-CM

## 2024-05-21 LAB
ALBUMIN SERPL-MCNC: 5 G/DL (ref 3.5–5.2)
ALBUMIN UR-MCNC: 4.7 MG/DL
ALBUMIN/GLOB SERPL: 2 G/DL
ALP SERPL-CCNC: 73 U/L (ref 39–117)
ALT SERPL W P-5'-P-CCNC: 55 U/L (ref 1–41)
ANION GAP SERPL CALCULATED.3IONS-SCNC: 11.3 MMOL/L (ref 5–15)
AST SERPL-CCNC: 45 U/L (ref 1–40)
BACTERIA UR QL AUTO: NORMAL /HPF
BILIRUB SERPL-MCNC: 0.8 MG/DL (ref 0–1.2)
BILIRUB UR QL STRIP: NEGATIVE
BUN SERPL-MCNC: 13 MG/DL (ref 6–20)
BUN/CREAT SERPL: 14.4 (ref 7–25)
CALCIUM SPEC-SCNC: 9.9 MG/DL (ref 8.6–10.5)
CHLORIDE SERPL-SCNC: 98 MMOL/L (ref 98–107)
CHOLEST SERPL-MCNC: 100 MG/DL (ref 0–200)
CLARITY UR: CLEAR
CO2 SERPL-SCNC: 26.7 MMOL/L (ref 22–29)
COLOR UR: YELLOW
CREAT SERPL-MCNC: 0.9 MG/DL (ref 0.76–1.27)
DEPRECATED RDW RBC AUTO: 41.4 FL (ref 37–54)
EGFRCR SERPLBLD CKD-EPI 2021: 99.6 ML/MIN/1.73
ERYTHROCYTE [DISTWIDTH] IN BLOOD BY AUTOMATED COUNT: 12.6 % (ref 12.3–15.4)
GLOBULIN UR ELPH-MCNC: 2.5 GM/DL
GLUCOSE SERPL-MCNC: 113 MG/DL (ref 65–99)
GLUCOSE UR STRIP-MCNC: ABNORMAL MG/DL
HBA1C MFR BLD: 7.8 % (ref 4.8–5.6)
HCT VFR BLD AUTO: 46.8 % (ref 37.5–51)
HDLC SERPL-MCNC: 28 MG/DL (ref 40–60)
HGB BLD-MCNC: 15.7 G/DL (ref 13–17.7)
HGB UR QL STRIP.AUTO: NEGATIVE
KETONES UR QL STRIP: ABNORMAL
LDLC SERPL CALC-MCNC: 43 MG/DL (ref 0–100)
LDLC/HDLC SERPL: 1.36 {RATIO}
LEUKOCYTE ESTERASE UR QL STRIP.AUTO: NEGATIVE
MCH RBC QN AUTO: 30.1 PG (ref 26.6–33)
MCHC RBC AUTO-ENTMCNC: 33.5 G/DL (ref 31.5–35.7)
MCV RBC AUTO: 89.7 FL (ref 79–97)
NITRITE UR QL STRIP: NEGATIVE
PH UR STRIP.AUTO: 5.5 [PH] (ref 5–8)
PLATELET # BLD AUTO: 321 10*3/MM3 (ref 140–450)
PMV BLD AUTO: 9 FL (ref 6–12)
POTASSIUM SERPL-SCNC: 4.4 MMOL/L (ref 3.5–5.2)
PROT SERPL-MCNC: 7.5 G/DL (ref 6–8.5)
PROT UR QL STRIP: NEGATIVE
RBC # BLD AUTO: 5.22 10*6/MM3 (ref 4.14–5.8)
RBC # UR STRIP: NORMAL /HPF
REF LAB TEST METHOD: NORMAL
SODIUM SERPL-SCNC: 136 MMOL/L (ref 136–145)
SP GR UR STRIP: 1.02 (ref 1–1.03)
SQUAMOUS #/AREA URNS HPF: NORMAL /HPF
TRIGL SERPL-MCNC: 170 MG/DL (ref 0–150)
TSH SERPL DL<=0.05 MIU/L-ACNC: 0.93 UIU/ML (ref 0.27–4.2)
UROBILINOGEN UR QL STRIP: ABNORMAL
VLDLC SERPL-MCNC: 29 MG/DL (ref 5–40)
WBC # UR STRIP: NORMAL /HPF
WBC NRBC COR # BLD AUTO: 8.3 10*3/MM3 (ref 3.4–10.8)

## 2024-05-21 PROCEDURE — 36415 COLL VENOUS BLD VENIPUNCTURE: CPT

## 2024-05-21 PROCEDURE — 81001 URINALYSIS AUTO W/SCOPE: CPT

## 2024-05-21 PROCEDURE — 80061 LIPID PANEL: CPT

## 2024-05-21 PROCEDURE — 69209 REMOVE IMPACTED EAR WAX UNI: CPT | Performed by: FAMILY MEDICINE

## 2024-05-21 PROCEDURE — 83036 HEMOGLOBIN GLYCOSYLATED A1C: CPT

## 2024-05-21 PROCEDURE — 82043 UR ALBUMIN QUANTITATIVE: CPT

## 2024-05-21 PROCEDURE — 80050 GENERAL HEALTH PANEL: CPT

## 2024-05-21 PROCEDURE — 99396 PREV VISIT EST AGE 40-64: CPT | Performed by: FAMILY MEDICINE

## 2024-05-21 RX ORDER — LISINOPRIL AND HYDROCHLOROTHIAZIDE 25; 20 MG/1; MG/1
1 TABLET ORAL DAILY
Qty: 90 TABLET | Refills: 3 | Status: SHIPPED | OUTPATIENT
Start: 2024-05-21

## 2024-05-21 RX ORDER — METFORMIN HYDROCHLORIDE 500 MG/1
1000 TABLET, EXTENDED RELEASE ORAL DAILY
Qty: 180 TABLET | Refills: 3 | Status: SHIPPED | OUTPATIENT
Start: 2024-05-21

## 2024-05-21 RX ORDER — ROSUVASTATIN CALCIUM 20 MG/1
20 TABLET, COATED ORAL DAILY
Qty: 90 TABLET | Refills: 3 | Status: SHIPPED | OUTPATIENT
Start: 2024-05-21

## 2024-05-21 RX ORDER — PAROXETINE HYDROCHLORIDE 20 MG/1
20 TABLET, FILM COATED ORAL DAILY
Qty: 90 TABLET | Refills: 3 | Status: SHIPPED | OUTPATIENT
Start: 2024-05-21
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
## 2024-05-21 NOTE — PROGRESS NOTES
Ear Cerumen Removal    Date/Time: 5/21/2024 1:20 PM    Performed by: Kita Mckinley RN  Authorized by: Stevan Rubio MD    Anesthesia:  Local Anesthetic: none  Location details: left ear and right ear  Patient tolerance: patient tolerated the procedure well with no immediate complications  Procedure type: irrigation   Sedation:  Patient sedated: no

## 2024-05-30 ENCOUNTER — OFFICE VISIT (OUTPATIENT)
Dept: CARDIOLOGY | Facility: CLINIC | Age: 59
End: 2024-05-30
Payer: COMMERCIAL

## 2024-05-30 VITALS
BODY MASS INDEX: 32.85 KG/M2 | DIASTOLIC BLOOD PRESSURE: 63 MMHG | HEIGHT: 74 IN | SYSTOLIC BLOOD PRESSURE: 130 MMHG | WEIGHT: 256 LBS | HEART RATE: 76 BPM

## 2024-05-30 DIAGNOSIS — I25.10 CORONARY ARTERY DISEASE INVOLVING NATIVE CORONARY ARTERY OF NATIVE HEART WITHOUT ANGINA PECTORIS: Primary | ICD-10-CM

## 2024-05-30 DIAGNOSIS — E78.5 HYPERLIPIDEMIA LDL GOAL <70: ICD-10-CM

## 2024-05-30 DIAGNOSIS — I10 ESSENTIAL HYPERTENSION: ICD-10-CM

## 2024-05-30 PROCEDURE — 99214 OFFICE O/P EST MOD 30 MIN: CPT | Performed by: INTERNAL MEDICINE

## 2024-05-30 NOTE — ASSESSMENT & PLAN NOTE
Nutrition/Diabetes Progress Report    Jeannette Gordon was seen from 1430 to 1530 for Type 2 diabetes self-management training, including nutrition and diabetes education in a group setting.     Barriers and Readiness to Learning:  The instruction was given to patient and other class participants.   Barriers to self-care and learning limitations: No changes.     Preferences for Learning: no preference   Readiness to learn: The patient demonstrates the ability to understand and asks questions.  Learning needs were assessed and the patient requires education in diabetes disease process/treatment options, medical nutrition therapy, physical activity, blood glucose monitoring, diabetes medications, acute complications, chronic complications, diabetes psychosocial adjustment and strategies and goal setting.     Material was presented using verbal, written, demonstration and return demonstration.     Assessment:  Not reviewed due to class setting.    Medications:   Current Outpatient Prescriptions   Medication Sig Dispense Refill   • metformin (GLUCOPHAGE-XR) 500 MG 24 hr tablet Take 1 tablet by mouth daily (with dinner). 90 tablet 2   • cetirizine (ZYRTEC) 10 MG tablet Take one tablet daily 90 tablet 3   • amitriptyline (ELAVIL) 25 MG tablet Take 1-2 tablets by mouth nightly at bedtime 180 tablet 3   • famotidine (PEPCID) 20 MG tablet Take 1 tablet by mouth 2 times daily. 180 tablet 3   • albuterol 108 (90 Base) MCG/ACT inhaler 2 inhalations every four to six hours as needed or thirty minutes prior to exercise. 2 Inhaler 3   • omeprazole (PRILOSEC) 40 MG capsule 1 capsule by mouth thirty minutes before eating twice daily. 180 capsule 3   • mometasone-formoterol (DULERA) 200-5 MCG/ACT inhaler Inhale 2 puffs into the lungs 2 times daily. 3 Inhaler 3   • promethazine-codeine (PHENERGAN WITH CODEINE) 6.25-10 MG/5ML syrup Take 5 mLs by mouth 4 times daily as needed for Cough. 120 mL 0   • PATADAY 0.2 % ophthalmic solution  Patient with LDL at goal continue on Zetia 10 mg daily and Crestor 20 nightly    Place 1 drop into both eyes once daily. 5 mL 1   • triamterene-hydrochlorothiazide (DYAZIDE) 37.5-25 MG per capsule Take 1 capsule by mouth daily. 90 capsule 1   • ibuprofen (MOTRIN) 600 MG tablet Take 1 tablet by mouth every 8 hours as needed for Pain. 60 tablet 0   • cyclobenzaprine (FLEXERIL) 5 MG tablet Take 1 tablet by mouth daily as needed for Muscle spasms. 30 tablet 0   • hydrocortisone (CORTIZONE) 1 % cream Apply to affected area daily 30 g 0   • Calcium Carbonate-Vitamin D (CALTRATE 600+D PO) Take 1 tablet by mouth daily. Indications: Bone Health       No current facility-administered medications for this visit.        Anthropometric Measurements:   Wt Readings from Last 4 Encounters:   09/12/17 92.6 kg   08/22/17 92.5 kg   07/05/17 94.9 kg   06/15/17 92.5 kg       Estimated body mass index is 37.34 kg/m² as calculated from the following:    Height as of 8/22/17: 5' 2\" (1.575 m).    Weight as of this encounter: 92.6 kg.    Biochemical Data, Medical Tests, and Procedures:  Hemoglobin A1C (%)   Date Value   02/13/2012 5.8      Glucose (mg/dL)   Date Value   05/18/2017 211 (H)   10/06/2016 115 (H)   09/06/2016 118 (H)     CHOLESTEROL (mg/dL)   Date Value   11/26/2012 168      HDL (mg/dL)   Date Value   11/26/2012 48    No components found for: LDL   TRIGLYCERIDE (mg/dL)   Date Value   11/26/2012 59      CALCULATED LDL (mg/dL)   Date Value   11/26/2012 108    No components found for: MICROALBUMIN    Blood Sugars:  See RN progress report.    Client History:  Pt here for class 1 out of 3.     Nutrition Diagnosis:   Food and nutrition-related knowledge deficit related to limited meal planning ability, as evidenced by difficulty identifying appropriate food portions and using food labels.    Nutrition Prescription:  Meal plan with 45 grams of carbohydrates per meal.  Unsweetened beverages  3 meals per day at every 4-5 hours    Intervention:  Nutrition education focused on nutrition relationship to health/disease  and skill development in the following areas of nutrition management:  Effect of timing, amount, and type of carbohydrate on blood sugar; understanding of nutrition labels in meal planning; understanding of personalized meal plan; effect of modest weight loss, if overweight, on blood sugar control; effects of physical activity on blood sugar and general health benefits; and effect of stress on blood glucose.    Nutrition Education:   Nutrition Education - Content    Focused on:    Purpose of the nutrition education E-1.1:  Nutrition and Diabetes Education.     Nutrition Education - Application    Skill development in the following areas E-2.2:  Nutrition - Effect of timing, amount and type of carbohydrates on blood sugar,  Effect of portion size,  Understanding of personalized meal plan,  Plate method,  Competent .      Nutrition Counseling:      Theoretical Basis/Approach    Cognitive-Behavioral Theory   Utilizing:  Goal setting.     Print/Written Resources provided:   [employer]'s Your Guide to Living with Diabetes booklet and [employer]'s [place] Diabetes Conversation Maps    Monitoring and Evaluation:  Patient demonstrated basic level of knowledge in above areas of healthy food selection and preparation ability by verbalizing food choices to fit the nutrition prescription.    Plan to evaluate:  Use sugar-free substitute instead of sugar/ replace sugary beverages with sugar-free beverages, and cut back on Halloween candy.  Goal Setting to Promote Health & Problem Solving for Daily Living was discussed.  Patient selected goal of:  Use sugar-free substitute instead of sugar/ replace sugary beverages with sugar-free beverages, and cut back on Halloween candy.  Patient achieved goal new% of the time.  The patient will carry their diabetes identification at all times.  Patient achieved goal new% of the time.    Diabetes Self-Management Support Plan:    Diabetes Support Group  Family Support    Diabetes Support Website  Exercise  Facility:   X Diabetes Educator Contact Number  [employer]:    Care Management Program  Food/Weight Counseling Program    Patient Assistance Program  Annual Program Review    Community Program: Living Well with Chronic Conditions Classes  Behavioral Change Specialist    Community Program: Healthy Living with Diabetes  Websites: [email or web address].org    Exercise partner X Attend DSMT Class 2     Recommended Follow-up:  Follow-up appointment class 2.  The patient was encouraged to call back if any questions or concerns.    See Patient Instructions for further information.  Thank you for your referral.  Please contact me with any question/concerns.  The MD referral is located in EMR.    Diabetes Education Referral Expires: 8/21/18     Medicare hours remaining: DSMT: 8  MNT: 3, once referral ordered

## 2024-05-30 NOTE — PROGRESS NOTES
Chief Complaint  Coronary Artery Disease and 6 MONTH FOLLOW UP     Subjective    Patient has been doing well not have any anginal chest pain no change in his breathing ability is very active physically at work typically getting over 6 miles of ambulation per day  Past Medical History:   Diagnosis Date    CAD 10/04/2021    non-ST-elevation myocardial infarction, 100% occlusion of the RCA    Colon polyp     At last colonoscopy    Essential hypertension 10/04/2021    Hyperlipidemia LDL goal <70 10/04/2021    Myocardial infarction 2/2019    Sleep apnea     Type 2 diabetes mellitus     Umbilical hernia     Ventral incisional hernia          Current Outpatient Medications:     aspirin 81 MG EC tablet, Take 1 tablet by mouth Daily., Disp: , Rfl:     Brilinta 60 MG tablet tablet, TAKE ONE TABLET BY MOUTH TWICE A DAY, Disp: 180 tablet, Rfl: 3    Dulaglutide (Trulicity) 3 MG/0.5ML solution pen-injector, Inject 0.5 mL under the skin into the appropriate area as directed 1 (One) Time Per Week., Disp: 6.5 mL, Rfl: 3    empagliflozin (Jardiance) 25 MG tablet tablet, Take 1 tablet by mouth Every Morning., Disp: 90 tablet, Rfl: 3    ezetimibe (ZETIA) 10 MG tablet, TAKE ONE TABLET BY MOUTH DAILY, Disp: 90 tablet, Rfl: 2    lisinopril-hydrochlorothiazide (PRINZIDE,ZESTORETIC) 20-25 MG per tablet, Take 1 tablet by mouth Daily., Disp: 90 tablet, Rfl: 3    metFORMIN ER (GLUCOPHAGE-XR) 500 MG 24 hr tablet, Take 2 tablets by mouth Daily., Disp: 180 tablet, Rfl: 3    metoprolol succinate XL (TOPROL-XL) 25 MG 24 hr tablet, TAKE 1 TABLET BY MOUTH DAILY, Disp: 90 tablet, Rfl: 0    PARoxetine (PAXIL) 20 MG tablet, Take 1 tablet by mouth Daily., Disp: 90 tablet, Rfl: 3    rosuvastatin (CRESTOR) 20 MG tablet, Take 1 tablet by mouth Daily., Disp: 90 tablet, Rfl: 3    Tetanus-Diphth-Acell Pertussis (Boostrix) 5-2.5-18.5 LF-MCG/0.5 injection, Inject  into the appropriate muscle as directed by prescriber., Disp: 0.5 mL, Rfl: 0    Zoster Vac Recomb  "Adjuvanted (Shingrix) 50 MCG/0.5ML reconstituted suspension, Inject  into the appropriate muscle as directed by prescriber., Disp: 1 each, Rfl: 1    There are no discontinued medications.  No Known Allergies     Social History     Tobacco Use    Smoking status: Former     Current packs/day: 0.00     Average packs/day: 1.5 packs/day for 34.1 years (51.1 ttl pk-yrs)     Types: Cigarettes     Start date: 1983     Quit date: 2017     Years since quittin.3    Smokeless tobacco: Current     Types: Snuff   Vaping Use    Vaping status: Never Used   Substance Use Topics    Alcohol use: Yes     Comment: Beer or mixed drink maybe once a month    Drug use: Never       Family History   Problem Relation Age of Onset    Heart failure Father     Atrial fibrillation Father     COPD Father     Diabetes type II Father     Hypertension Father     Heart disease Father     Diabetes Father     Diabetes Brother     Malig Hyperthermia Neg Hx         Objective     /63   Pulse 76   Ht 188 cm (74.02\")   Wt 116 kg (256 lb)   BMI 32.85 kg/m²       Physical Exam    General Appearance:   no acute distress  Alert and oriented x3  HENT:   lips not cyanotic  Atraumatic  Neck:  No jvd   supple  Respiratory:  no respiratory distress  normal breath sounds  no rales  Cardiovascular:  Regular rate and rhythm  no S3, no S4   no murmur  no rub  Extremities  No cyanosis  lower extremity edema: none    Skin:   warm, dry  No rashes      Result Review :     No results found for: \"PROBNP\"  CMP          2023    14:14 2023    07:14 2024    13:41   CMP   Glucose 202  159  113    BUN 16  17  13    Creatinine 1.00  1.06  0.90    EGFR 88.3  82.4  99.6    Sodium 138  137  136    Potassium 4.6  4.3  4.4    Chloride 101  98  98    Calcium 9.3  10.3  9.9    Total Protein  7.5  7.5    Albumin  5.3  5.0    Globulin  2.2  2.5    Total Bilirubin  0.6  0.8    Alkaline Phosphatase  73  73    AST (SGOT)  23  45    ALT (SGPT)  46  55  " "  Albumin/Globulin Ratio  2.4  2.0    BUN/Creatinine Ratio 16.0  16.0  14.4    Anion Gap 11.0  13.0  11.3      CBC w/diff          12/21/2023    07:14 5/21/2024    13:41   CBC w/Diff   WBC 8.95  8.30    RBC 5.40  5.22    Hemoglobin 16.2  15.7    Hematocrit 49.9  46.8    MCV 92.4  89.7    MCH 30.0  30.1    MCHC 32.5  33.5    RDW 12.3  12.6    Platelets 324  321       Lab Results   Component Value Date    TSH 0.931 05/21/2024      Lab Results   Component Value Date    FREET4 1.1 02/20/2019      No results found for: \"DDIMERQUANT\"  Magnesium   Date Value Ref Range Status   02/18/2019 2.05 1.60 - 2.30 mg/dL Final      No results found for: \"DIGOXIN\"   Lab Results   Component Value Date    TROPONINT 0.56 (H) 02/20/2019           Lipid Panel          12/21/2023    07:14 5/21/2024    13:41   Lipid Panel   Total Cholesterol 108  100    Triglycerides 164  170    HDL Cholesterol 31  28    VLDL Cholesterol 28  29    LDL Cholesterol  49  43    LDL/HDL Ratio 1.43  1.36      Lab Results   Component Value Date    POCTROP 0.00 02/18/2019                      Diagnoses and all orders for this visit:    1. CAD (Primary)  Assessment & Plan:  Patient is doing well no ongoing angina continue with chronic aspirin 81 mg daily and Brilinta 60 twice daily        2. Essential hypertension  Assessment & Plan:  Blood pressure well-controlled continue with lisinopril HCTZ 20/25 daily and Toprol 25 daily dosing       3. Hyperlipidemia LDL goal <70  Assessment & Plan:  Patient with LDL at goal continue on Zetia 10 mg daily and Crestor 20 nightly               Follow Up     Return in about 6 months (around 11/30/2024) for Follow with Karli Allan, EKG with F/U.          Patient was given instructions and counseling regarding his condition or for health maintenance advice. Please see specific information pulled into the AVS if appropriate.       "

## 2024-05-30 NOTE — ASSESSMENT & PLAN NOTE
Blood pressure well-controlled continue with lisinopril HCTZ 20/25 daily and Toprol 25 daily dosing

## 2024-06-28 DIAGNOSIS — E78.5 HYPERLIPIDEMIA LDL GOAL <70: ICD-10-CM

## 2024-06-28 RX ORDER — EZETIMIBE 10 MG/1
10 TABLET ORAL DAILY
Qty: 90 TABLET | Refills: 2 | Status: SHIPPED | OUTPATIENT
Start: 2024-06-28

## 2024-07-25 DIAGNOSIS — I25.10 CORONARY ARTERY DISEASE INVOLVING NATIVE CORONARY ARTERY OF NATIVE HEART WITHOUT ANGINA PECTORIS: ICD-10-CM

## 2024-07-25 RX ORDER — TICAGRELOR 60 MG/1
60 TABLET ORAL 2 TIMES DAILY
Qty: 180 TABLET | Refills: 3 | Status: SHIPPED | OUTPATIENT
Start: 2024-07-25

## 2024-08-05 RX ORDER — METOPROLOL SUCCINATE 25 MG/1
25 TABLET, EXTENDED RELEASE ORAL DAILY
Qty: 90 TABLET | Refills: 3 | Status: SHIPPED | OUTPATIENT
Start: 2024-08-05

## 2024-08-22 ENCOUNTER — TELEPHONE (OUTPATIENT)
Dept: FAMILY MEDICINE CLINIC | Age: 59
End: 2024-08-22
Payer: COMMERCIAL

## 2024-08-22 ENCOUNTER — LAB (OUTPATIENT)
Dept: LAB | Facility: HOSPITAL | Age: 59
End: 2024-08-22
Payer: COMMERCIAL

## 2024-08-22 DIAGNOSIS — R74.01 ELEVATED TRANSAMINASE LEVEL: ICD-10-CM

## 2024-08-22 DIAGNOSIS — E78.5 HYPERLIPIDEMIA LDL GOAL <70: ICD-10-CM

## 2024-08-22 DIAGNOSIS — E11.59 TYPE 2 DIABETES MELLITUS WITH OTHER CIRCULATORY COMPLICATION, WITHOUT LONG-TERM CURRENT USE OF INSULIN: ICD-10-CM

## 2024-08-22 DIAGNOSIS — E11.59 TYPE 2 DIABETES MELLITUS WITH OTHER CIRCULATORY COMPLICATION, WITHOUT LONG-TERM CURRENT USE OF INSULIN: Primary | ICD-10-CM

## 2024-08-22 LAB
ALBUMIN SERPL-MCNC: 5.2 G/DL (ref 3.5–5.2)
ALP SERPL-CCNC: 77 U/L (ref 39–117)
ALT SERPL W P-5'-P-CCNC: 64 U/L (ref 1–41)
AST SERPL-CCNC: 51 U/L (ref 1–40)
BILIRUB CONJ SERPL-MCNC: <0.2 MG/DL (ref 0–0.3)
BILIRUB INDIRECT SERPL-MCNC: ABNORMAL MG/DL
BILIRUB SERPL-MCNC: 0.7 MG/DL (ref 0–1.2)
HBA1C MFR BLD: 10 % (ref 4.8–5.6)
PROT SERPL-MCNC: 7.9 G/DL (ref 6–8.5)

## 2024-08-22 PROCEDURE — 83036 HEMOGLOBIN GLYCOSYLATED A1C: CPT

## 2024-08-22 PROCEDURE — 80076 HEPATIC FUNCTION PANEL: CPT

## 2024-08-22 PROCEDURE — 36415 COLL VENOUS BLD VENIPUNCTURE: CPT

## 2024-08-22 NOTE — TELEPHONE ENCOUNTER
----- Message from Marifer FLORES sent at 5/22/2024  9:12 AM EDT -----  TICKLE A1c and hepatic function panel in 90 days for type 2 diabetes, mixed hyperlipidemia, elevated transaminases.  Thanks.

## 2024-09-06 ENCOUNTER — OFFICE VISIT (OUTPATIENT)
Dept: FAMILY MEDICINE CLINIC | Age: 59
End: 2024-09-06
Payer: COMMERCIAL

## 2024-09-06 VITALS
BODY MASS INDEX: 31.49 KG/M2 | WEIGHT: 245.4 LBS | DIASTOLIC BLOOD PRESSURE: 62 MMHG | SYSTOLIC BLOOD PRESSURE: 106 MMHG | HEART RATE: 66 BPM | TEMPERATURE: 97.9 F | OXYGEN SATURATION: 96 % | HEIGHT: 74 IN

## 2024-09-06 DIAGNOSIS — E11.59 TYPE 2 DIABETES MELLITUS WITH OTHER CIRCULATORY COMPLICATION, WITHOUT LONG-TERM CURRENT USE OF INSULIN: Primary | ICD-10-CM

## 2024-09-06 PROCEDURE — 99214 OFFICE O/P EST MOD 30 MIN: CPT | Performed by: FAMILY MEDICINE
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
## 2024-09-06 NOTE — Clinical Note
Please TICKLE to call him in 4 weeks.  May we increase the dose of Mounjaro to 7.5 mg weekly.  Also, see how the fasting sugars are tracking.  Has he noted any improvement compared to where they have been?  Thanks.

## 2024-09-24 ENCOUNTER — OFFICE VISIT (OUTPATIENT)
Dept: PULMONOLOGY | Facility: CLINIC | Age: 59
End: 2024-09-24
Payer: COMMERCIAL

## 2024-09-24 VITALS
TEMPERATURE: 98.4 F | WEIGHT: 247.6 LBS | HEIGHT: 74 IN | SYSTOLIC BLOOD PRESSURE: 123 MMHG | RESPIRATION RATE: 16 BRPM | HEART RATE: 69 BPM | BODY MASS INDEX: 31.78 KG/M2 | DIASTOLIC BLOOD PRESSURE: 78 MMHG | OXYGEN SATURATION: 97 %

## 2024-09-24 DIAGNOSIS — G47.33 OBSTRUCTIVE SLEEP APNEA: Primary | ICD-10-CM

## 2024-09-24 PROCEDURE — 99213 OFFICE O/P EST LOW 20 MIN: CPT | Performed by: INTERNAL MEDICINE

## 2024-10-04 ENCOUNTER — TELEPHONE (OUTPATIENT)
Dept: FAMILY MEDICINE CLINIC | Age: 59
End: 2024-10-04
Payer: COMMERCIAL

## 2024-10-04 DIAGNOSIS — E11.59 TYPE 2 DIABETES MELLITUS WITH OTHER CIRCULATORY COMPLICATION, WITHOUT LONG-TERM CURRENT USE OF INSULIN: Primary | ICD-10-CM

## 2024-10-04 NOTE — TELEPHONE ENCOUNTER
The 7.5 mg dose of Mounjaro has just been sent.  Please TICKLE to call him in 4 weeks to see if we may increase to 10 mg weekly.  Thanks.

## 2024-10-04 NOTE — TELEPHONE ENCOUNTER
----- Message from Marifer FLORES sent at 9/6/2024 11:47 AM EDT -----  TICKLE to call him in 4 weeks.  May we increase the dose of Mounjaro to 7.5 mg weekly.  Also, see how the fasting sugars are tracking.  Has he noted any improvement compared to where they have been?  Thanks.

## 2024-10-04 NOTE — TELEPHONE ENCOUNTER
Pt states he is ready to increase dose, rx pended. He states he has noticed decrease in sugars, they are running 114-140. Rx pended

## 2024-10-30 DIAGNOSIS — E11.59 TYPE 2 DIABETES MELLITUS WITH OTHER CIRCULATORY COMPLICATION, WITHOUT LONG-TERM CURRENT USE OF INSULIN: ICD-10-CM

## 2024-11-02 RX ORDER — TIRZEPATIDE 7.5 MG/.5ML
INJECTION, SOLUTION SUBCUTANEOUS
Qty: 2 ML | Refills: 0 | OUTPATIENT
Start: 2024-11-02

## 2024-11-02 NOTE — TELEPHONE ENCOUNTER
The 10 mg dose has been sent.  We will review further at his follow-up visit later this month.  Thanks.

## 2024-11-03 ENCOUNTER — PATIENT MESSAGE (OUTPATIENT)
Dept: FAMILY MEDICINE CLINIC | Age: 59
End: 2024-11-03
Payer: COMMERCIAL

## 2024-11-03 DIAGNOSIS — E11.59 TYPE 2 DIABETES MELLITUS WITH OTHER CIRCULATORY COMPLICATION, WITHOUT LONG-TERM CURRENT USE OF INSULIN: ICD-10-CM

## 2024-11-18 ENCOUNTER — HOSPITAL ENCOUNTER (OUTPATIENT)
Dept: GENERAL RADIOLOGY | Facility: HOSPITAL | Age: 59
Discharge: HOME | End: 2024-11-18
Payer: COMMERCIAL

## 2024-11-18 ENCOUNTER — LAB (OUTPATIENT)
Dept: LAB | Facility: HOSPITAL | Age: 59
End: 2024-11-18
Payer: COMMERCIAL

## 2024-11-18 ENCOUNTER — OFFICE VISIT (OUTPATIENT)
Dept: FAMILY MEDICINE CLINIC | Age: 59
End: 2024-11-18
Payer: COMMERCIAL

## 2024-11-18 VITALS
HEART RATE: 77 BPM | TEMPERATURE: 98.2 F | SYSTOLIC BLOOD PRESSURE: 100 MMHG | BODY MASS INDEX: 30.67 KG/M2 | DIASTOLIC BLOOD PRESSURE: 65 MMHG | OXYGEN SATURATION: 95 % | WEIGHT: 239 LBS | HEIGHT: 74 IN

## 2024-11-18 DIAGNOSIS — I25.10 CORONARY ARTERY DISEASE INVOLVING NATIVE CORONARY ARTERY OF NATIVE HEART WITHOUT ANGINA PECTORIS: ICD-10-CM

## 2024-11-18 DIAGNOSIS — G89.29 CHRONIC PAIN OF RIGHT ANKLE: ICD-10-CM

## 2024-11-18 DIAGNOSIS — I10 ESSENTIAL HYPERTENSION: ICD-10-CM

## 2024-11-18 DIAGNOSIS — E78.5 HYPERLIPIDEMIA LDL GOAL <70: ICD-10-CM

## 2024-11-18 DIAGNOSIS — M25.571 CHRONIC PAIN OF RIGHT ANKLE: ICD-10-CM

## 2024-11-18 DIAGNOSIS — M79.671 ACUTE PAIN OF RIGHT FOOT: ICD-10-CM

## 2024-11-18 DIAGNOSIS — E11.59 TYPE 2 DIABETES MELLITUS WITH OTHER CIRCULATORY COMPLICATION, WITHOUT LONG-TERM CURRENT USE OF INSULIN: ICD-10-CM

## 2024-11-18 DIAGNOSIS — Z79.899 OTHER LONG TERM (CURRENT) DRUG THERAPY: ICD-10-CM

## 2024-11-18 DIAGNOSIS — E11.59 TYPE 2 DIABETES MELLITUS WITH OTHER CIRCULATORY COMPLICATION, WITHOUT LONG-TERM CURRENT USE OF INSULIN: Primary | ICD-10-CM

## 2024-11-18 DIAGNOSIS — F17.210 NICOTINE DEPENDENCE, CIGARETTES, UNCOMPLICATED: ICD-10-CM

## 2024-11-18 LAB
ALBUMIN SERPL-MCNC: 5 G/DL (ref 3.5–5.2)
ALBUMIN/GLOB SERPL: 2 G/DL
ALP SERPL-CCNC: 68 U/L (ref 39–117)
ALT SERPL W P-5'-P-CCNC: 43 U/L (ref 1–41)
ANION GAP SERPL CALCULATED.3IONS-SCNC: 13.5 MMOL/L (ref 5–15)
AST SERPL-CCNC: 41 U/L (ref 1–40)
BILIRUB SERPL-MCNC: 0.8 MG/DL (ref 0–1.2)
BUN SERPL-MCNC: 15 MG/DL (ref 6–20)
BUN/CREAT SERPL: 12.6 (ref 7–25)
CALCIUM SPEC-SCNC: 9.9 MG/DL (ref 8.6–10.5)
CHLORIDE SERPL-SCNC: 96 MMOL/L (ref 98–107)
CO2 SERPL-SCNC: 26.5 MMOL/L (ref 22–29)
CREAT SERPL-MCNC: 1.19 MG/DL (ref 0.76–1.27)
DEPRECATED RDW RBC AUTO: 39.8 FL (ref 37–54)
EGFRCR SERPLBLD CKD-EPI 2021: 71.2 ML/MIN/1.73
ERYTHROCYTE [DISTWIDTH] IN BLOOD BY AUTOMATED COUNT: 12 % (ref 12.3–15.4)
GLOBULIN UR ELPH-MCNC: 2.5 GM/DL
GLUCOSE SERPL-MCNC: 114 MG/DL (ref 65–99)
HBA1C MFR BLD: 7.3 % (ref 4.8–5.6)
HCT VFR BLD AUTO: 48 % (ref 37.5–51)
HGB BLD-MCNC: 16.1 G/DL (ref 13–17.7)
MCH RBC QN AUTO: 30.1 PG (ref 26.6–33)
MCHC RBC AUTO-ENTMCNC: 33.5 G/DL (ref 31.5–35.7)
MCV RBC AUTO: 89.7 FL (ref 79–97)
PLATELET # BLD AUTO: 375 10*3/MM3 (ref 140–450)
PMV BLD AUTO: 9 FL (ref 6–12)
POTASSIUM SERPL-SCNC: 4.1 MMOL/L (ref 3.5–5.2)
PROT SERPL-MCNC: 7.5 G/DL (ref 6–8.5)
RBC # BLD AUTO: 5.35 10*6/MM3 (ref 4.14–5.8)
SODIUM SERPL-SCNC: 136 MMOL/L (ref 136–145)
WBC NRBC COR # BLD AUTO: 9.12 10*3/MM3 (ref 3.4–10.8)

## 2024-11-18 PROCEDURE — 36415 COLL VENOUS BLD VENIPUNCTURE: CPT

## 2024-11-18 PROCEDURE — 99214 OFFICE O/P EST MOD 30 MIN: CPT | Performed by: FAMILY MEDICINE

## 2024-11-18 PROCEDURE — 73610 X-RAY EXAM OF ANKLE: CPT

## 2024-11-18 PROCEDURE — 83036 HEMOGLOBIN GLYCOSYLATED A1C: CPT

## 2024-11-18 PROCEDURE — 85027 COMPLETE CBC AUTOMATED: CPT

## 2024-11-18 PROCEDURE — 80053 COMPREHEN METABOLIC PANEL: CPT

## 2024-11-18 PROCEDURE — 73630 X-RAY EXAM OF FOOT: CPT

## 2024-11-18 RX ORDER — LISINOPRIL AND HYDROCHLOROTHIAZIDE 12.5; 2 MG/1; MG/1
1 TABLET ORAL DAILY
Qty: 90 TABLET | Refills: 3 | Status: SHIPPED | OUTPATIENT
Start: 2024-11-18

## 2024-11-18 RX ORDER — ROSUVASTATIN CALCIUM 20 MG/1
20 TABLET, COATED ORAL DAILY
Qty: 90 TABLET | Refills: 3 | Status: SHIPPED | OUTPATIENT
Start: 2024-11-18

## 2024-11-18 RX ORDER — LISINOPRIL AND HYDROCHLOROTHIAZIDE 20; 25 MG/1; MG/1
1 TABLET ORAL DAILY
Qty: 90 TABLET | Refills: 3 | Status: SHIPPED | OUTPATIENT
Start: 2024-11-18 | End: 2024-11-18

## 2024-11-18 RX ORDER — PAROXETINE 20 MG/1
20 TABLET, FILM COATED ORAL DAILY
Qty: 90 TABLET | Refills: 3 | Status: SHIPPED | OUTPATIENT
Start: 2024-11-18

## 2024-11-18 RX ORDER — METFORMIN HYDROCHLORIDE 500 MG/1
1000 TABLET, EXTENDED RELEASE ORAL DAILY
Qty: 180 TABLET | Refills: 3 | Status: SHIPPED | OUTPATIENT
Start: 2024-11-18

## 2024-11-18 RX ORDER — TIRZEPATIDE 12.5 MG/.5ML
12.5 INJECTION, SOLUTION SUBCUTANEOUS WEEKLY
Qty: 2 ML | Refills: 0 | Status: SHIPPED | OUTPATIENT
Start: 2024-11-18

## 2024-11-18 NOTE — Clinical Note
Please let Kurt know that his blood pressure was low normal earlier.  It looks like it has also been trending downward with the weight loss that he has had.  Because of this, I have sent a lower dose of lisinopril-hydrochlorothiazide.  The new dose will be 20-12.5 daily.  I would recommend he discard the old bottle when he gets the new and to avoid any confusion.  Let me know if he has concerns regarding this.  Thanks.
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
## 2024-11-29 DIAGNOSIS — E11.59 TYPE 2 DIABETES MELLITUS WITH OTHER CIRCULATORY COMPLICATION, WITHOUT LONG-TERM CURRENT USE OF INSULIN: ICD-10-CM

## 2024-12-02 RX ORDER — TIRZEPATIDE 10 MG/.5ML
INJECTION, SOLUTION SUBCUTANEOUS
Qty: 2 ML | Refills: 0 | OUTPATIENT
Start: 2024-12-02

## 2024-12-09 ENCOUNTER — OFFICE VISIT (OUTPATIENT)
Dept: CARDIOLOGY | Facility: CLINIC | Age: 59
End: 2024-12-09
Payer: COMMERCIAL

## 2024-12-09 VITALS
WEIGHT: 241.5 LBS | SYSTOLIC BLOOD PRESSURE: 98 MMHG | HEART RATE: 80 BPM | HEIGHT: 74 IN | BODY MASS INDEX: 30.99 KG/M2 | DIASTOLIC BLOOD PRESSURE: 62 MMHG

## 2024-12-09 DIAGNOSIS — I25.10 CORONARY ARTERY DISEASE INVOLVING NATIVE CORONARY ARTERY OF NATIVE HEART WITHOUT ANGINA PECTORIS: Primary | ICD-10-CM

## 2024-12-09 DIAGNOSIS — E78.5 HYPERLIPIDEMIA LDL GOAL <70: ICD-10-CM

## 2024-12-09 DIAGNOSIS — I10 ESSENTIAL HYPERTENSION: ICD-10-CM

## 2024-12-09 PROCEDURE — 99214 OFFICE O/P EST MOD 30 MIN: CPT | Performed by: NURSE PRACTITIONER

## 2024-12-09 PROCEDURE — 93000 ELECTROCARDIOGRAM COMPLETE: CPT | Performed by: NURSE PRACTITIONER
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
## 2024-12-14 DIAGNOSIS — E11.59 TYPE 2 DIABETES MELLITUS WITH OTHER CIRCULATORY COMPLICATION, WITHOUT LONG-TERM CURRENT USE OF INSULIN: ICD-10-CM

## 2024-12-16 RX ORDER — TIRZEPATIDE 12.5 MG/.5ML
INJECTION, SOLUTION SUBCUTANEOUS
Qty: 2 ML | Refills: 0 | OUTPATIENT
Start: 2024-12-16

## 2024-12-16 NOTE — TELEPHONE ENCOUNTER
Noted.  I have sent long-term refills on the 15 mg dose of Mounjaro to [facility] just now.  Thanks

## 2024-12-24 ENCOUNTER — HOSPITAL ENCOUNTER (OUTPATIENT)
Dept: CT IMAGING | Facility: HOSPITAL | Age: 59
Discharge: HOME | End: 2024-12-24
Admitting: FAMILY MEDICINE
Payer: COMMERCIAL

## 2024-12-24 DIAGNOSIS — F17.210 NICOTINE DEPENDENCE, CIGARETTES, UNCOMPLICATED: ICD-10-CM

## 2024-12-24 PROCEDURE — 71271 CT THORAX LUNG CANCER SCR C-: CPT

## 2025-02-03 ENCOUNTER — TELEPHONE (OUTPATIENT)
Dept: CARDIOLOGY | Facility: CLINIC | Age: 60
End: 2025-02-03
Payer: COMMERCIAL

## 2025-02-03 NOTE — TELEPHONE ENCOUNTER
Procedure: Ankle Surgery    Med Directive: Brilinta, Aspirin    PMH: CAD, HTN, HLD    Last Seen: 12/09/2024

## 2025-02-18 ENCOUNTER — TELEPHONE (OUTPATIENT)
Dept: FAMILY MEDICINE CLINIC | Age: 60
End: 2025-02-18
Payer: COMMERCIAL

## 2025-02-18 DIAGNOSIS — E11.59 TYPE 2 DIABETES MELLITUS WITH OTHER CIRCULATORY COMPLICATION, WITHOUT LONG-TERM CURRENT USE OF INSULIN: Primary | ICD-10-CM

## 2025-02-18 DIAGNOSIS — Z12.5 SCREENING FOR PROSTATE CANCER: ICD-10-CM

## 2025-02-18 NOTE — TELEPHONE ENCOUNTER
----- Message from Jayne OJEDA sent at 11/20/2024  9:10 AM EST -----  Please TICKLE fingerstick A1c in 90 days.  Thanks. Also needs a PSA

## 2025-02-20 ENCOUNTER — TRANSCRIBE ORDERS (OUTPATIENT)
Dept: ADMINISTRATIVE | Facility: HOSPITAL | Age: 60
End: 2025-02-20
Payer: COMMERCIAL

## 2025-02-20 ENCOUNTER — LAB (OUTPATIENT)
Dept: LAB | Facility: HOSPITAL | Age: 60
End: 2025-02-20
Payer: COMMERCIAL

## 2025-02-20 DIAGNOSIS — Z12.5 SCREENING FOR PROSTATE CANCER: ICD-10-CM

## 2025-02-20 DIAGNOSIS — Z01.818 PREOP EXAMINATION: Primary | ICD-10-CM

## 2025-02-20 DIAGNOSIS — E11.59 TYPE 2 DIABETES MELLITUS WITH OTHER CIRCULATORY COMPLICATION, WITHOUT LONG-TERM CURRENT USE OF INSULIN: ICD-10-CM

## 2025-02-20 DIAGNOSIS — Z01.818 PREOP EXAMINATION: ICD-10-CM

## 2025-02-20 LAB
ANION GAP SERPL CALCULATED.3IONS-SCNC: 10.9 MMOL/L (ref 5–15)
BASOPHILS # BLD AUTO: 0.04 10*3/MM3 (ref 0–0.2)
BASOPHILS NFR BLD AUTO: 0.5 % (ref 0–1.5)
BUN SERPL-MCNC: 14 MG/DL (ref 6–20)
BUN/CREAT SERPL: 13.5 (ref 7–25)
CALCIUM SPEC-SCNC: 10.1 MG/DL (ref 8.6–10.5)
CHLORIDE SERPL-SCNC: 102 MMOL/L (ref 98–107)
CO2 SERPL-SCNC: 24.1 MMOL/L (ref 22–29)
CREAT SERPL-MCNC: 1.04 MG/DL (ref 0.76–1.27)
DEPRECATED RDW RBC AUTO: 42.1 FL (ref 37–54)
EGFRCR SERPLBLD CKD-EPI 2021: 83.7 ML/MIN/1.73
EOSINOPHIL # BLD AUTO: 0.28 10*3/MM3 (ref 0–0.4)
EOSINOPHIL NFR BLD AUTO: 3.3 % (ref 0.3–6.2)
ERYTHROCYTE [DISTWIDTH] IN BLOOD BY AUTOMATED COUNT: 12.6 % (ref 12.3–15.4)
GLUCOSE SERPL-MCNC: 161 MG/DL (ref 65–99)
HBA1C MFR BLD: 6.6 % (ref 4.8–5.6)
HCT VFR BLD AUTO: 49.7 % (ref 37.5–51)
HGB BLD-MCNC: 16.3 G/DL (ref 13–17.7)
IMM GRANULOCYTES # BLD AUTO: 0.03 10*3/MM3 (ref 0–0.05)
IMM GRANULOCYTES NFR BLD AUTO: 0.3 % (ref 0–0.5)
LYMPHOCYTES # BLD AUTO: 2.64 10*3/MM3 (ref 0.7–3.1)
LYMPHOCYTES NFR BLD AUTO: 30.7 % (ref 19.6–45.3)
MCH RBC QN AUTO: 29.7 PG (ref 26.6–33)
MCHC RBC AUTO-ENTMCNC: 32.8 G/DL (ref 31.5–35.7)
MCV RBC AUTO: 90.7 FL (ref 79–97)
MONOCYTES # BLD AUTO: 0.82 10*3/MM3 (ref 0.1–0.9)
MONOCYTES NFR BLD AUTO: 9.5 % (ref 5–12)
NEUTROPHILS NFR BLD AUTO: 4.78 10*3/MM3 (ref 1.7–7)
NEUTROPHILS NFR BLD AUTO: 55.7 % (ref 42.7–76)
PLATELET # BLD AUTO: 339 10*3/MM3 (ref 140–450)
PMV BLD AUTO: 8.8 FL (ref 6–12)
POTASSIUM SERPL-SCNC: 4.5 MMOL/L (ref 3.5–5.2)
PSA SERPL-MCNC: 0.44 NG/ML (ref 0–4)
RBC # BLD AUTO: 5.48 10*6/MM3 (ref 4.14–5.8)
SODIUM SERPL-SCNC: 137 MMOL/L (ref 136–145)
WBC NRBC COR # BLD AUTO: 8.59 10*3/MM3 (ref 3.4–10.8)

## 2025-02-20 PROCEDURE — 80048 BASIC METABOLIC PNL TOTAL CA: CPT

## 2025-02-20 PROCEDURE — 85025 COMPLETE CBC W/AUTO DIFF WBC: CPT

## 2025-02-20 PROCEDURE — 36415 COLL VENOUS BLD VENIPUNCTURE: CPT

## 2025-02-20 PROCEDURE — G0103 PSA SCREENING: HCPCS

## 2025-02-20 PROCEDURE — 83036 HEMOGLOBIN GLYCOSYLATED A1C: CPT

## 2025-03-26 ENCOUNTER — OFFICE VISIT (OUTPATIENT)
Dept: PULMONOLOGY | Facility: CLINIC | Age: 60
End: 2025-03-26
Payer: COMMERCIAL

## 2025-03-26 VITALS
DIASTOLIC BLOOD PRESSURE: 69 MMHG | WEIGHT: 240 LBS | RESPIRATION RATE: 18 BRPM | HEART RATE: 84 BPM | TEMPERATURE: 96.8 F | BODY MASS INDEX: 30.8 KG/M2 | OXYGEN SATURATION: 97 % | SYSTOLIC BLOOD PRESSURE: 110 MMHG | HEIGHT: 74 IN

## 2025-03-26 DIAGNOSIS — G47.33 OBSTRUCTIVE SLEEP APNEA: Primary | ICD-10-CM

## 2025-03-26 DIAGNOSIS — E78.5 HYPERLIPIDEMIA LDL GOAL <70: ICD-10-CM

## 2025-03-26 PROCEDURE — 99213 OFFICE O/P EST LOW 20 MIN: CPT | Performed by: INTERNAL MEDICINE

## 2025-03-26 RX ORDER — OXYCODONE AND ACETAMINOPHEN 5; 325 MG/1; MG/1
TABLET ORAL
COMMUNITY
Start: 2025-03-07

## 2025-03-26 NOTE — PROGRESS NOTES
Pulmonary Office Follow-up    Subjective     Kurt Saucedo is seen today at the office for   Chief Complaint   Patient presents with    Follow-up     6 Months    Obstructive sleep apnea         HPI  Kurt Saucedo is a 60 y.o. male with a PMH significant for obstructive sleep apnea presents for follow-up patient has been doing well and denies any shortness of breath cough or wheeze's his sleep quality has been good and he uses the CPAP machine regularly      Tobacco use history:  Former smoker      Patient Active Problem List   Diagnosis    CAD    Essential hypertension    Hyperlipidemia LDL goal <70    Type 2 diabetes mellitus    Screening for malignant neoplasm of colon    History of colonic polyps    Umbilical hernia without obstruction and without gangrene    Class 1 obesity due to excess calories with serious comorbidity and body mass index (BMI) of 32.0 to 32.9 in adult       Review of Systems  Review of Systems   All other systems reviewed and are negative.    As described in the HPI. Otherwise, remainder of ROS (14 systems) were negative.    Medications, Allergies, Social, and Family Histories reviewed as per EMR.    Result Review :            Objective     Vitals:    03/26/25 1543   BP: 110/69   Pulse: 84   Resp: 18   Temp: 96.8 °F (36 °C)   SpO2: 97%         03/26/25  1543   Weight: 109 kg (240 lb)       Physical Exam  Vitals and nursing note reviewed.   Constitutional:       Appearance: Normal appearance.   HENT:      Head: Normocephalic and atraumatic.   Cardiovascular:      Rate and Rhythm: Normal rate and regular rhythm.      Pulses: Normal pulses.      Heart sounds: Normal heart sounds.   Pulmonary:      Effort: Pulmonary effort is normal.      Breath sounds: Normal breath sounds.   Musculoskeletal:      Cervical back: Normal range of motion and neck supple.   Skin:     General: Skin is warm.      Capillary Refill: Capillary refill takes 2 to 3 seconds.   Neurological:      Mental Status: He is  alert and oriented to person, place, and time.   Psychiatric:         Mood and Affect: Mood normal.         Behavior: Behavior normal.         No radiology results for the last 90 days.     Assessment & Plan     Diagnoses and all orders for this visit:    1. Obstructive sleep apnea (Primary)         Discussion/ Recommendations:   Patient is advised to reduce weight his BMI is 30.81  Advised to continue compliance with his CPAP  Vaccinations discussed and recommended             Return in about 6 months (around 9/26/2025).          This document has been electronically signed by Marty Johnston MD on March 26, 2025 15:52 EDT

## 2025-03-27 RX ORDER — EZETIMIBE 10 MG/1
10 TABLET ORAL DAILY
Qty: 90 TABLET | Refills: 2 | Status: SHIPPED | OUTPATIENT
Start: 2025-03-27

## 2025-05-30 ENCOUNTER — OFFICE VISIT (OUTPATIENT)
Dept: FAMILY MEDICINE CLINIC | Age: 60
End: 2025-05-30
Payer: COMMERCIAL

## 2025-05-30 VITALS
WEIGHT: 240.2 LBS | DIASTOLIC BLOOD PRESSURE: 67 MMHG | HEART RATE: 88 BPM | SYSTOLIC BLOOD PRESSURE: 103 MMHG | BODY MASS INDEX: 30.83 KG/M2 | OXYGEN SATURATION: 95 % | TEMPERATURE: 98 F | HEIGHT: 74 IN

## 2025-05-30 DIAGNOSIS — E78.5 HYPERLIPIDEMIA LDL GOAL <70: ICD-10-CM

## 2025-05-30 DIAGNOSIS — E11.59 TYPE 2 DIABETES MELLITUS WITH OTHER CIRCULATORY COMPLICATION, WITHOUT LONG-TERM CURRENT USE OF INSULIN: ICD-10-CM

## 2025-05-30 DIAGNOSIS — I10 ESSENTIAL HYPERTENSION: ICD-10-CM

## 2025-05-30 DIAGNOSIS — Z00.00 PHYSICAL EXAM: Primary | ICD-10-CM

## 2025-05-30 DIAGNOSIS — I25.10 CORONARY ARTERY DISEASE INVOLVING NATIVE CORONARY ARTERY OF NATIVE HEART WITHOUT ANGINA PECTORIS: ICD-10-CM

## 2025-05-30 PROCEDURE — 99396 PREV VISIT EST AGE 40-64: CPT | Performed by: FAMILY MEDICINE

## 2025-05-30 RX ORDER — LISINOPRIL AND HYDROCHLOROTHIAZIDE 12.5; 2 MG/1; MG/1
0.5 TABLET ORAL DAILY
Start: 2025-05-30

## 2025-05-30 RX ORDER — METFORMIN HYDROCHLORIDE 500 MG/1
1000 TABLET, EXTENDED RELEASE ORAL DAILY
Qty: 180 TABLET | Refills: 3 | Status: SHIPPED | OUTPATIENT
Start: 2025-05-30

## 2025-05-30 RX ORDER — ROSUVASTATIN CALCIUM 20 MG/1
20 TABLET, COATED ORAL DAILY
Qty: 90 TABLET | Refills: 3 | Status: SHIPPED | OUTPATIENT
Start: 2025-05-30
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
## 2025-06-19 ENCOUNTER — TELEPHONE (OUTPATIENT)
Dept: FAMILY MEDICINE CLINIC | Age: 60
End: 2025-06-19
Payer: COMMERCIAL

## 2025-06-19 NOTE — TELEPHONE ENCOUNTER
----- Message from Marifer FLORES sent at 5/30/2025  3:23 PM EDT -----  Please call him on 6/19/2025.  I want him to move ahead with ordered labs at that time.

## 2025-06-23 ENCOUNTER — LAB (OUTPATIENT)
Dept: LAB | Facility: HOSPITAL | Age: 60
End: 2025-06-23
Payer: COMMERCIAL

## 2025-06-23 DIAGNOSIS — Z00.00 PHYSICAL EXAM: ICD-10-CM

## 2025-06-23 DIAGNOSIS — E11.59 TYPE 2 DIABETES MELLITUS WITH OTHER CIRCULATORY COMPLICATION, WITHOUT LONG-TERM CURRENT USE OF INSULIN: ICD-10-CM

## 2025-06-23 DIAGNOSIS — I25.10 CORONARY ARTERY DISEASE INVOLVING NATIVE CORONARY ARTERY OF NATIVE HEART WITHOUT ANGINA PECTORIS: ICD-10-CM

## 2025-06-23 DIAGNOSIS — E78.5 HYPERLIPIDEMIA LDL GOAL <70: ICD-10-CM

## 2025-06-23 LAB
ALBUMIN SERPL-MCNC: 4.6 G/DL (ref 3.5–5.2)
ALBUMIN UR-MCNC: 2.6 MG/DL
ALBUMIN/GLOB SERPL: 1.9 G/DL
ALP SERPL-CCNC: 115 U/L (ref 39–117)
ALT SERPL W P-5'-P-CCNC: 31 U/L (ref 1–41)
ANION GAP SERPL CALCULATED.3IONS-SCNC: 12.9 MMOL/L (ref 5–15)
AST SERPL-CCNC: 26 U/L (ref 1–40)
BILIRUB SERPL-MCNC: 0.4 MG/DL (ref 0–1.2)
BUN SERPL-MCNC: 14 MG/DL (ref 6–20)
BUN/CREAT SERPL: 16.3 (ref 7–25)
CALCIUM SPEC-SCNC: 9.7 MG/DL (ref 8.6–10.5)
CHLORIDE SERPL-SCNC: 102 MMOL/L (ref 98–107)
CHOLEST SERPL-MCNC: 111 MG/DL (ref 0–200)
CO2 SERPL-SCNC: 23.1 MMOL/L (ref 22–29)
CREAT SERPL-MCNC: 0.86 MG/DL (ref 0.76–1.27)
CREAT UR-MCNC: 102.9 MG/DL
EGFRCR SERPLBLD CKD-EPI 2021: 100.4 ML/MIN/1.73
GLOBULIN UR ELPH-MCNC: 2.4 GM/DL
GLUCOSE SERPL-MCNC: 155 MG/DL (ref 65–99)
HBA1C MFR BLD: 6.9 % (ref 4.8–5.6)
HDLC SERPL-MCNC: 35 MG/DL (ref 40–60)
LDLC SERPL CALC-MCNC: 54 MG/DL (ref 0–100)
LDLC/HDLC SERPL: 1.49 {RATIO}
MICROALBUMIN/CREAT UR: 25.3 MG/G (ref 0–29)
POTASSIUM SERPL-SCNC: 4.7 MMOL/L (ref 3.5–5.2)
PROT SERPL-MCNC: 7 G/DL (ref 6–8.5)
SODIUM SERPL-SCNC: 138 MMOL/L (ref 136–145)
TRIGL SERPL-MCNC: 120 MG/DL (ref 0–150)
TSH SERPL DL<=0.05 MIU/L-ACNC: 1.11 UIU/ML (ref 0.27–4.2)
VLDLC SERPL-MCNC: 22 MG/DL (ref 5–40)

## 2025-06-23 PROCEDURE — 82570 ASSAY OF URINE CREATININE: CPT

## 2025-06-23 PROCEDURE — 82043 UR ALBUMIN QUANTITATIVE: CPT

## 2025-06-23 PROCEDURE — 84443 ASSAY THYROID STIM HORMONE: CPT

## 2025-06-23 PROCEDURE — 83036 HEMOGLOBIN GLYCOSYLATED A1C: CPT

## 2025-06-23 PROCEDURE — 80053 COMPREHEN METABOLIC PANEL: CPT

## 2025-06-23 PROCEDURE — 80061 LIPID PANEL: CPT

## 2025-06-23 PROCEDURE — 36415 COLL VENOUS BLD VENIPUNCTURE: CPT

## 2025-06-27 ENCOUNTER — OFFICE VISIT (OUTPATIENT)
Dept: CARDIOLOGY | Facility: CLINIC | Age: 60
End: 2025-06-27
Payer: COMMERCIAL

## 2025-06-27 VITALS
DIASTOLIC BLOOD PRESSURE: 79 MMHG | HEART RATE: 80 BPM | WEIGHT: 238.4 LBS | HEIGHT: 74 IN | SYSTOLIC BLOOD PRESSURE: 124 MMHG | BODY MASS INDEX: 30.6 KG/M2

## 2025-06-27 DIAGNOSIS — I25.10 CORONARY ARTERY DISEASE INVOLVING NATIVE CORONARY ARTERY OF NATIVE HEART WITHOUT ANGINA PECTORIS: Primary | ICD-10-CM

## 2025-06-27 DIAGNOSIS — E78.5 HYPERLIPIDEMIA LDL GOAL <70: ICD-10-CM

## 2025-06-27 DIAGNOSIS — I10 ESSENTIAL HYPERTENSION: ICD-10-CM

## 2025-06-27 PROCEDURE — 99214 OFFICE O/P EST MOD 30 MIN: CPT | Performed by: NURSE PRACTITIONER

## 2025-06-27 RX ORDER — TICAGRELOR 60 MG/1
60 TABLET, FILM COATED ORAL 2 TIMES DAILY
Qty: 180 TABLET | Refills: 3 | Status: SHIPPED | OUTPATIENT
Start: 2025-06-27

## 2025-06-27 RX ORDER — METOPROLOL SUCCINATE 25 MG/1
25 TABLET, EXTENDED RELEASE ORAL DAILY
Qty: 90 TABLET | Refills: 3 | Status: SHIPPED | OUTPATIENT
Start: 2025-06-27
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
## 2025-07-13 ENCOUNTER — RESULTS FOLLOW-UP (OUTPATIENT)
Dept: FAMILY MEDICINE CLINIC | Age: 60
End: 2025-07-13
Payer: COMMERCIAL

## 2025-07-13 NOTE — LETTER
Kurt Saucedo  [address] [address]    2025     Good evening, Kurt.  I hope you are well.  Forgive my delay in responding to these results, but I have been away from the office for the last few weeks.  Please be aware of the following regarding your testin.  Your cholesterol is well-controlled.  2.  The random glucose is 155.  Your A1c is 6.9% which is up from 6.6% on the last check.  I would lean against any change in medication, but please be diligent with regard to diet and weight.  3.  The rest of your labs are basically normal including tests for the kidneys, electrolytes, liver, thyroid, and urine.     Otherwise, continue your current care and medications, and plan to see me again in November as scheduled, sooner if needed.  Let me know if you have other concerns.     Stevan Rubio MD  [facility]      Resulted Orders   Hemoglobin A1c   Result Value Ref Range    Hemoglobin A1C 6.90 (H) 4.80 - 5.60 %   Lipid Panel   Result Value Ref Range    Total Cholesterol 111 0 - 200 mg/dL    Triglycerides 120 0 - 150 mg/dL    HDL Cholesterol 35 (L) 40 - 60 mg/dL    LDL Cholesterol  54 0 - 100 mg/dL    VLDL Cholesterol 22 5 - 40 mg/dL    LDL/HDL Ratio 1.49    Comprehensive Metabolic Panel   Result Value Ref Range    Glucose 155 (H) 65 - 99 mg/dL    BUN 14.0 6.0 - 20.0 mg/dL    Creatinine 0.86 0.76 - 1.27 mg/dL    Sodium 138 136 - 145 mmol/L    Potassium 4.7 3.5 - 5.2 mmol/L    Chloride 102 98 - 107 mmol/L    CO2 23.1 22.0 - 29.0 mmol/L    Calcium 9.7 8.6 - 10.5 mg/dL    Total Protein 7.0 6.0 - 8.5 g/dL    Albumin 4.6 3.5 - 5.2 g/dL    ALT (SGPT) 31 1 - 41 U/L    AST (SGOT) 26 1 - 40 U/L    Alkaline Phosphatase 115 39 - 117 U/L    Total Bilirubin 0.4 0.0 - 1.2 mg/dL    Globulin 2.4 gm/dL    A/G Ratio 1.9 g/dL    BUN/Creatinine Ratio 16.3 7.0 - 25.0    Anion Gap 12.9 5.0 - 15.0 mmol/L    eGFR 100.4 >60.0 mL/min/1.73   Microalbumin / Creatinine Urine Ratio - Urine,  Clean Catch   Result Value Ref Range    Microalbumin/Creatinine Ratio 25.3 0.0 - 29.0 mg/g    Creatinine, Urine 102.9 mg/dL    Microalbumin, Urine 2.6 mg/dL   TSH   Result Value Ref Range    TSH 1.110 0.270 - 4.200 uIU/mL

## (undated) DEVICE — GOWN,REINFRCE,POLY,SIRUS,BREATH SLV,XXLG: Brand: MEDLINE

## (undated) DEVICE — STERILE POLYISOPRENE POWDER-FREE SURGICAL GLOVES WITH EMOLLIENT COATING: Brand: PROTEXIS

## (undated) DEVICE — SOLIDIFIER LIQLOC PLS 1500CC BT

## (undated) DEVICE — LINER SURG CANSTR SXN S/RIGD 1500CC

## (undated) DEVICE — SOL IRR NACL 0.9PCT BT 1000ML

## (undated) DEVICE — CONN JET HYDRA H20 AUXILIARY DISP

## (undated) DEVICE — GLV SURG SENSICARE PI ORTHO SZ8 LF STRL

## (undated) DEVICE — Device: Brand: DEFENDO AIR/WATER/SUCTION AND BIOPSY VALVE

## (undated) DEVICE — Device

## (undated) DEVICE — SOL IRRG H2O PL/BG 1000ML STRL